# Patient Record
Sex: MALE | Race: BLACK OR AFRICAN AMERICAN | Employment: FULL TIME | URBAN - METROPOLITAN AREA
[De-identification: names, ages, dates, MRNs, and addresses within clinical notes are randomized per-mention and may not be internally consistent; named-entity substitution may affect disease eponyms.]

---

## 2017-04-11 ENCOUNTER — DOCUMENTATION ONLY (OUTPATIENT)
Dept: FAMILY MEDICINE CLINIC | Age: 22
End: 2017-04-11

## 2017-12-26 ENCOUNTER — HOSPITAL ENCOUNTER (OUTPATIENT)
Dept: GENERAL RADIOLOGY | Age: 22
Discharge: HOME | End: 2017-12-26
Payer: OTHER GOVERNMENT

## 2017-12-26 ENCOUNTER — OFFICE VISIT (OUTPATIENT)
Dept: FAMILY MEDICINE CLINIC | Age: 22
End: 2017-12-26

## 2017-12-26 VITALS
OXYGEN SATURATION: 98 % | DIASTOLIC BLOOD PRESSURE: 81 MMHG | HEIGHT: 74 IN | TEMPERATURE: 98 F | SYSTOLIC BLOOD PRESSURE: 123 MMHG | RESPIRATION RATE: 18 BRPM | BODY MASS INDEX: 30.03 KG/M2 | WEIGHT: 234 LBS | HEART RATE: 60 BPM

## 2017-12-26 DIAGNOSIS — M79.675 GREAT TOE PAIN, LEFT: ICD-10-CM

## 2017-12-26 DIAGNOSIS — B34.9 VIRAL ILLNESS: ICD-10-CM

## 2017-12-26 DIAGNOSIS — M79.675 GREAT TOE PAIN, LEFT: Primary | ICD-10-CM

## 2017-12-26 PROCEDURE — 73660 X-RAY EXAM OF TOE(S): CPT

## 2017-12-26 PROCEDURE — 73630 X-RAY EXAM OF FOOT: CPT

## 2017-12-26 RX ORDER — IBUPROFEN 800 MG/1
800 TABLET ORAL
Qty: 60 TAB | Refills: 1 | Status: SHIPPED | OUTPATIENT
Start: 2017-12-26

## 2017-12-26 NOTE — PROGRESS NOTES
Chief Complaint   Patient presents with    Mouth Lesions    Nasal Congestion    Cough    Toe Pain     left great toe     Patient in office today for mouth lesions and cold sx that began 2 wks ago; have not treated with otc. Denies any changes in medication, spices, or foods. Sores in the gumlines and cut like feeling in the mouth. States looked like a little cut. Comes and goes. Varying places in the mouth. States that everything cleared up on its own. Last occurred about a week ago. Under a lot of stress right now. Working two jobs and not sleeping as much. Had associated sinus congestion and cough. Now more congestion in the throat that he is able to cough up. Denies any fever. Denies any sick contacts. Took benadryl OTC without relief. Pt have c/o toe pain that began 4 wks ago, pt states he stubbed toe on wall. Have not noticed improvement in pain. Ran the toe into the wall. Tried to walk it off. Denies any swelling or bruising. Now had persistent pain if he hits his toe on something. Denies any other concerns at this time. Chief Complaint   Patient presents with    Mouth Lesions    Nasal Congestion    Cough    Toe Pain     left great toe     he is a 25y.o. year old male who presents for evalution. Reviewed PmHx, RxHx, FmHx, SocHx, AllgHx and updated and dated in the chart.     Review of Systems - negative except as listed above in the HPI    Objective:     Vitals:    12/26/17 0848   BP: 123/81   Pulse: 60   Resp: 18   Temp: 98 °F (36.7 °C)   TempSrc: Oral   SpO2: 98%   Weight: 234 lb (106.1 kg)   Height: 6' 2\" (1.88 m)     Physical Examination: General appearance - alert, well appearing, and in no distress  Eyes - pupils equal and reactive, extraocular eye movements intact  Ears - bilateral TM's and external ear canals normal  Nose - normal and patent, no erythema, discharge or polyps and normal nontender sinuses  Mouth - mucous membranes moist, pharynx normal without lesions  Neck - supple, no significant adenopathy  Chest - clear to auscultation, no wheezes, rales or rhonchi, symmetric air entry  Heart - normal rate, regular rhythm, normal S1, S2, no murmurs  Musculoskeletal - abnormal exam of right foot, pain and swelling noted at distal first metatarsal / head of bone; toe ROM intact  Extremities - peripheral pulses normal, no edema, no clubbing or cyanosis    Assessment/ Plan:   Diagnoses and all orders for this visit:    1. Great toe pain, left  -     XR GREAT TOE LT MIN 2 V; Future  -     XR FOOT LT MIN 3 V; Future  -     ibuprofen (MOTRIN) 800 mg tablet; Take 1 Tab by mouth every eight (8) hours as needed for Pain. Recommended xray for further evaluation. Will notify results and deviate plan based on findings. PRN motrin for pain. Reviewed other supportive measures. If sx persist or worsen, will need to see ortho for further evaluation. 2. Viral illness  No evidence of persistent sx. Continue supportive measures. Follow up if sx persist or worsen. Follow-up Disposition:  Return if symptoms worsen or fail to improve. I have discussed the diagnosis with the patient and the intended plan as seen in the above orders. The patient has received an after-visit summary and questions were answered concerning future plans. Medication Side Effects and Warnings were discussed with patient: yes  Patient Labs were reviewed and or requested: yes  Patient Past Records were reviewed and or requested  yes  Patient / Caregiver Understanding of treatment plan was verbalized during office visit YES    DREA Sutton    There are no Patient Instructions on file for this visit.

## 2017-12-26 NOTE — MR AVS SNAPSHOT
Visit Information Date & Time Provider Department Dept. Phone Encounter #  
 12/26/2017  8:30 AM Bora Rdz NP [address] [facility] [phone] [id number] Follow-up Instructions Return if symptoms worsen or fail to improve. Upcoming Health Maintenance Date Due DTaP/Tdap/Td series (1 - Tdap) 2/28/2016 Influenza Age 5 to Adult 8/1/2017 Allergies as of 12/26/2017  Review Complete On: 12/26/2017 By: Bora Rdz NP No Known Allergies Current Immunizations  Never Reviewed No immunizations on file. Not reviewed this visit You Were Diagnosed With   
  
 Codes Comments Great toe pain, left    -  Primary ICD-10-CM: B64.874 ICD-9-CM: 729.5 Viral illness     ICD-10-CM: B34.9 ICD-9-CM: 079.99 Vitals BP Pulse Temp Resp Height(growth percentile) Weight(growth percentile) 123/81 (BP 1 Location: Right arm, BP Patient Position: Sitting) 60 98 °F (36.7 °C) (Oral) 18 6' 2\" (1.88 m) 234 lb (106.1 kg) SpO2 BMI Smoking Status 98% 30.04 kg/m2 Never Smoker Vitals History BMI and BSA Data Body Mass Index Body Surface Area 30.04 kg/m 2 2.35 m 2 Preferred Pharmacy Pharmacy Name Phone [address] [phone] Your Updated Medication List  
  
   
This list is accurate as of: 12/26/17  9:18 AM.  Always use your most recent med list.  
  
  
  
  
 ibuprofen 800 mg tablet Commonly known as:  MOTRIN Take 1 Tab by mouth every eight (8) hours as needed for Pain. Prescriptions Sent to Pharmacy Refills  
 ibuprofen (MOTRIN) 800 mg tablet 1 Sig: Take 1 Tab by mouth every eight (8) hours as needed for Pain. Class: Normal  
 Pharmacy: [facility] [address] [facility] [address] Ph #: [phone] Route: Oral  
  
Follow-up Instructions Return if symptoms worsen or fail to improve. To-Do List   
 12/26/2017 Imaging:  XR FOOT LT MIN 3 V   
  
 12/26/2017 Imaging:  XR GREAT TOE LT MIN 2 V Introducing [facility] HEALTH SERVICES! [facility] introduces Mobile[employer]aks patient portal. Now you can access parts of your medical record, email your doctor's office, and request medication refills online. 1. In your internet browser, go to [email or web address] 2. Click on the First Time User? Click Here link in the Sign In box. You will see the New Member Sign Up page. 3. Enter your MobilePaks Access Code exactly as it appears below. You will not need to use this code after youve completed the sign-up process. If you do not sign up before the expiration date, you must request a new code. · MobilePaks Access Code: [email or web address] Expires: 3/26/2018  9:18 AM 
 
4. Enter the last four digits of your Social Security Number (xxxx) and Date of Birth (mm/dd/yyyy) as indicated and click Submit. You will be taken to the next sign-up page. 5. Create a MobilePaks ID. This will be your MobilePaks login ID and cannot be changed, so think of one that is secure and easy to remember. 6. Create a MobilePaks password. You can change your password at any time. 7. Enter your Password Reset Question and Answer. This can be used at a later time if you forget your password. 8. Enter your e-mail address. You will receive e-mail notification when new information is available in [address] 9. Click Sign Up. You can now view and download portions of your medical record. 10. Click the Download Summary menu link to download a portable copy of your medical information. If you have questions, please visit the Frequently Asked Questions section of the MobilePaks website. Remember, MobilePaks is NOT to be used for urgent needs. For medical emergencies, dial 911. Now available from your iPhone and Android! Please provide this summary of care documentation to your next provider. Your primary care clinician is listed as JOSEFINA WILLETT. If you have any questions after today's visit, please call [phone].

## 2017-12-26 NOTE — PROGRESS NOTES
Chief Complaint   Patient presents with    Mouth Lesions    Nasal Congestion    Cough    Toe Pain     left great toe     Patient in office today for mouth lesions and cold sx that began 2 wks ago; have not treated with otc. Denies any changes in medication, spices, or foods. Pt have c/of of toe pain that began 4 wk ago,pt states he stubbed toe on wall. Have not noticed improvement in pain. 1. Have you been to the ER, urgent care clinic since your last visit? Hospitalized since your last visit? No    2. Have you seen or consulted any other health care providers outside of the [address] [place] [address] since your last visit? Include any pap smears or colon screening.  No

## 2017-12-26 NOTE — PROGRESS NOTES
Please notify pt the following:  Xray of foot and toe show that there is nonspecific swelling present where pt reports feeling pain (first metatarsal head). There is no evidence of any fracture present. At this point I recommend he continue to rest, apply ice intermittently, elevate, and take 800 mg motrin prescribed during OV TID PRN for pain. If sx persist or worsen after 2 weeks of supportive measures, I recommend he see ortho for further evaluation.

## 2018-02-15 ENCOUNTER — OFFICE VISIT (OUTPATIENT)
Dept: FAMILY MEDICINE CLINIC | Age: 23
End: 2018-02-15

## 2018-02-15 VITALS
WEIGHT: 236 LBS | SYSTOLIC BLOOD PRESSURE: 117 MMHG | RESPIRATION RATE: 18 BRPM | BODY MASS INDEX: 30.29 KG/M2 | TEMPERATURE: 98.4 F | OXYGEN SATURATION: 97 % | HEART RATE: 57 BPM | DIASTOLIC BLOOD PRESSURE: 73 MMHG | HEIGHT: 74 IN

## 2018-02-15 DIAGNOSIS — M79.675 PAIN OF TOE OF LEFT FOOT: ICD-10-CM

## 2018-02-15 DIAGNOSIS — L50.9 URTICARIA: Primary | ICD-10-CM

## 2018-02-15 RX ORDER — LORATADINE 10 MG/1
10 TABLET ORAL DAILY
Qty: 30 TAB | Refills: 11 | Status: SHIPPED | OUTPATIENT
Start: 2018-02-15 | End: 2018-05-17 | Stop reason: ALTCHOICE

## 2018-02-15 NOTE — MR AVS SNAPSHOT
[address] [address] 
[phone] Patient: Samy Cooper MRN:  :[id number] Visit Information Date & Time Provider Department Dept. Phone Encounter #  
 2/15/2018  9:45 AM Charles Dockery  [facility] [phone] [id number] Follow-up Instructions Return if symptoms worsen or fail to improve. Upcoming Health Maintenance Date Due DTaP/Tdap/Td series (1 - Tdap) 2016 Influenza Age 5 to Adult 2017 Allergies as of 2/15/2018  Review Complete On: 2/15/2018 By: Charles Dockery MD  
 No Known Allergies Current Immunizations  Never Reviewed No immunizations on file. Not reviewed this visit You Were Diagnosed With   
  
 Codes Comments Urticaria    -  Primary ICD-10-CM: L50.9 ICD-9-CM: 708.9 Pain of toe of left foot     ICD-10-CM: A24.894 ICD-9-CM: 729.5 Vitals BP Pulse Temp Resp Height(growth percentile) Weight(growth percentile) 117/73 (!) 57 98.4 °F (36.9 °C) (Oral) 18 6' 2\" (1.88 m) 236 lb (107 kg) SpO2 BMI Smoking Status 97% 30.3 kg/m2 Never Smoker Vitals History BMI and BSA Data Body Mass Index Body Surface Area  
 30.3 kg/m 2 2.36 m 2 Preferred Pharmacy Pharmacy Name Phone [address] [phone] Your Updated Medication List  
  
   
This list is accurate as of: 2/15/18 10:32 AM.  Always use your most recent med list.  
  
  
  
  
 ibuprofen 800 mg tablet Commonly known as:  MOTRIN Take 1 Tab by mouth every eight (8) hours as needed for Pain.  
  
 loratadine 10 mg tablet Commonly known as:  Liu Alley Take 1 Tab by mouth daily for 360 days. Prescriptions Sent to Pharmacy  Refills  
 loratadine (CLARITIN) 10 mg tablet 11  
 Sig: Take 1 Tab by mouth daily for 360 days. Class: Normal  
 Pharmacy: [facility] [place] [address] [facility] [address]  #: [phone] Route: Oral  
  
Follow-up Instructions Return if symptoms worsen or fail to improve. Introducing [facility] & HEALTH SERVICES! [facility] introduces [employer] patient portal. Now you can access parts of your medical record, email your doctor's office, and request medication refills online. 1. In your internet browser, go to [email or web address]. [email or web address] 2. Click on the First Time User? Click Here link in the Sign In box. You will see the New Member Sign Up page. 3. Enter your Obatech Access Code exactly as it appears below. You will not need to use this code after youve completed the sign-up process. If you do not sign up before the expiration date, you must request a new code. · Ob[employer]ch Access Code: [email or web address] Expires: 3/26/2018  9:18 AM 
 
4. Enter the last four digits of your Social Security Number (xxxx) and Date of Birth (mm/dd/yyyy) as indicated and click Submit. You will be taken to the next sign-up page. 5. Create a Obatech ID. This will be your Obatech login ID and cannot be changed, so think of one that is secure and easy to remember. 6. Create a Obatech password. You can change your password at any time. 7. Enter your Password Reset Question and Answer. This can be used at a later time if you forget your password. 8. Enter your e-mail address. You will receive e-mail notification when new information is available in [address] 9. Click Sign Up. You can now view and download portions of your medical record. 10. Click the Download Summary menu link to download a portable copy of your medical information. If you have questions, please visit the Frequently Asked Questions section of the Obatech website.  Remember, Obatech is NOT to be used for urgent needs. For medical emergencies, dial 911. Now available from your iPhone and Android! Please provide this summary of care documentation to your next provider. Your primary care clinician is listed as JOSEFINA WILLETT. If you have any questions after today's visit, please call [phone].

## 2018-02-15 NOTE — PROGRESS NOTES
Chief Complaint   Patient presents with    Rash     Rises with body heat    Foot Pain     Lt foot Great toe: stubbed/Ran into wall      Shaving bumps exception note    1. Have you been to the ER, urgent care clinic since your last visit? Hospitalized since your last visit? No    2. Have you seen or consulted any other health care providers outside of the [address] [place] [address] since your last visit? Include any pap smears or colon screening. No      One month ago injured toe      Chief Complaint   Patient presents with    Rash     Rises with body heat    Foot Pain     Lt foot Great toe: stubbed/Ran into wall      He is a 25 y.o. male who presents for evalution. Reviewed PmHx, RxHx, FmHx, SocHx, AllgHx and updated and dated in the chart. There are no active problems to display for this patient. Review of Systems - negative except as listed above in the HPI    Objective:     Vitals:    02/15/18 0956   BP: 117/73   Pulse: (!) 57   Resp: 18   Temp: 98.4 °F (36.9 °C)   TempSrc: Oral   SpO2: 97%   Weight: 236 lb (107 kg)   Height: 6' 2\" (1.88 m)     Physical Examination: General appearance - alert, well appearing, and in no distress  Chest - clear to auscultation, no wheezes, rales or rhonchi, symmetric air entry  Heart - normal rate, regular rhythm, normal S1, S2, no murmurs, rubs, clicks or gallops  Left great toe normal exam    Assessment/ Plan:   Diagnoses and all orders for this visit:    1. Urticaria  -     loratadine (CLARITIN) 10 mg tablet; Take 1 Tab by mouth daily for 360 days.  -add daily to see if better    2. Pain of toe of left foot  -observe, slowly getting better       Follow-up Disposition:  Return if symptoms worsen or fail to improve. I have discussed the diagnosis with the patient and the intended plan as seen in the above orders. The patient understands and agrees with the plan. The patient has received an after-visit summary and questions were answered concerning future plans. Medication Side Effects and Warnings were discussed with patient  Patient Labs were reviewed and or requested:  Patient Past Records were reviewed and or requested    Linnea Ribeiro M.D. There are no Patient Instructions on file for this visit.           \

## 2018-03-12 ENCOUNTER — DOCUMENTATION ONLY (OUTPATIENT)
Dept: FAMILY MEDICINE CLINIC | Age: 23
End: 2018-03-12

## 2018-03-12 NOTE — PROGRESS NOTES
Letter written & pt called to inform letter in front office for pick-up- voiced understanding. English

## 2018-03-14 ENCOUNTER — OFFICE VISIT (OUTPATIENT)
Dept: FAMILY MEDICINE CLINIC | Age: 23
End: 2018-03-14

## 2018-03-14 VITALS
OXYGEN SATURATION: 97 % | DIASTOLIC BLOOD PRESSURE: 69 MMHG | HEART RATE: 70 BPM | WEIGHT: 240 LBS | BODY MASS INDEX: 30.8 KG/M2 | HEIGHT: 74 IN | TEMPERATURE: 98.3 F | SYSTOLIC BLOOD PRESSURE: 121 MMHG | RESPIRATION RATE: 16 BRPM

## 2018-03-14 DIAGNOSIS — L50.9 URTICARIA: Primary | ICD-10-CM

## 2018-03-14 NOTE — LETTER
3/14/2018 11:29 AM 
 
Mr. Pete Voss Jose Patient suffers from urticaria due to contact and excessive heat production. Sincerely, Derek Lopez MD

## 2018-03-14 NOTE — MR AVS SNAPSHOT
[address] [address] 
[phone] Patient: Digna Toribio MRN:  :[id number] Visit Information Date & Time Provider Department Dept. Phone Encounter #  
 3/14/2018 10:00 AM Delfino Lees MD [address] [facility] [phone] [id number] Follow-up Instructions Return if symptoms worsen or fail to improve. Upcoming Health Maintenance Date Due DTaP/Tdap/Td series (1 - Tdap) 2016 Influenza Age 5 to Adult 2017 Allergies as of 3/14/2018  Review Complete On: 3/14/2018 By: Delfino Lees MD  
 No Known Allergies Current Immunizations  Never Reviewed No immunizations on file. Not reviewed this visit You Were Diagnosed With   
  
 Codes Comments Urticaria    -  Primary ICD-10-CM: L50.9 ICD-9-CM: 708. 9 Vitals BP Pulse Temp Resp Height(growth percentile) Weight(growth percentile) 121/69 70 98.3 °F (36.8 °C) (Oral) 16 6' 2\" (1.88 m) 240 lb (108.9 kg) SpO2 BMI Smoking Status 97% 30.81 kg/m2 Never Smoker Vitals History BMI and BSA Data Body Mass Index Body Surface Area  
 30.81 kg/m 2 2.38 m 2 Preferred Pharmacy Pharmacy Name Phone [facility] [phone] Your Updated Medication List  
  
   
This list is accurate as of 3/14/18 11:29 AM.  Always use your most recent med list.  
  
  
  
  
 ibuprofen 800 mg tablet Commonly known as:  MOTRIN Take 1 Tab by mouth every eight (8) hours as needed for Pain.  
  
 loratadine 10 mg tablet Commonly known as:  Tk Getting Take 1 Tab by mouth daily for 360 days. Follow-up Instructions Return if symptoms worsen or fail to improve. Introducing [facility] & HEALTH SERVICES!    
 [facility] introduces [employer] patient portal. Now you can access parts of your medical record, email your doctor's office, and request medication refills online. 1. In your internet browser, go to [email or web address] 2. Click on the First Time User? Click Here link in the Sign In box. You will see the New Member Sign Up page. 3. Enter your iLost Access Code exactly as it appears below. You will not need to use this code after youve completed the sign-up process. If you do not sign up before the expiration date, you must request a new code. · iLost Access Code: [email or web address] Expires: 3/26/2018 10:18 AM 
 
4. Enter the last four digits of your Social Security Number (xxxx) and Date of Birth (mm/dd/yyyy) as indicated and click Submit. You will be taken to the next sign-up page. 5. Create a iLost ID. This will be your iLost login ID and cannot be changed, so think of one that is secure and easy to remember. 6. Create a iLost password. You can change your password at any time. 7. Enter your Password Reset Question and Answer. This can be used at a later time if you forget your password. 8. Enter your e-mail address. You will receive e-mail notification when new information is available in [address] 9. Click Sign Up. You can now view and download portions of your medical record. 10. Click the Download Summary menu link to download a portable copy of your medical information. If you have questions, please visit the Frequently Asked Questions section of the iLost website. Remember, iLost is NOT to be used for urgent needs. For medical emergencies, dial 911. Now available from your iPhone and Android! Please provide this summary of care documentation to your next provider. Your primary care clinician is listed as JOSEFINA WILLETT. If you have any questions after today's visit, please call [phone].

## 2018-03-14 NOTE — PROGRESS NOTES
Chief Complaint   Patient presents with    Allergies     Requests referral    Skin Problem     1. Have you been to the ER, urgent care clinic since your last visit? Hospitalized since your last visit? No    2. Have you seen or consulted any other health care providers outside of the [address] [place] [address] since your last visit? Include any pap smears or colon screening. Yes Where: [facility]        Chief Complaint   Patient presents with    Allergies     Requests referral    Skin Problem     He is a 21 y.o. male who presents for evalution. Reviewed PmHx, RxHx, FmHx, SocHx, AllgHx and updated and dated in the chart. Patient Active Problem List    Diagnosis    Urticaria       Review of Systems - negative except as listed above in the HPI    Objective:     Vitals:    03/14/18 1101   BP: 121/69   Pulse: 70   Resp: 16   Temp: 98.3 °F (36.8 °C)   TempSrc: Oral   SpO2: 97%   Weight: 240 lb (108.9 kg)   Height: 6' 2\" (1.88 m)     Physical Examination: General appearance - alert, well appearing, and in no distress  Chest - clear to auscultation, no wheezes, rales or rhonchi, symmetric air entry  Heart - normal rate, regular rhythm, normal S1, S2, no murmurs, rubs, clicks or gallops    Assessment/ Plan:   Diagnoses and all orders for this visit:    1. Urticaria  -better with rx       Follow-up Disposition:  Return if symptoms worsen or fail to improve. I have discussed the diagnosis with the patient and the intended plan as seen in the above orders. The patient understands and agrees with the plan. The patient has received an after-visit summary and questions were answered concerning future plans. Medication Side Effects and Warnings were discussed with patient  Patient Labs were reviewed and or requested:  Patient Past Records were reviewed and or requested    Teresa Owens M.D. There are no Patient Instructions on file for this visit.

## 2018-05-17 ENCOUNTER — OFFICE VISIT (OUTPATIENT)
Dept: FAMILY MEDICINE CLINIC | Age: 23
End: 2018-05-17

## 2018-05-17 VITALS
WEIGHT: 244 LBS | TEMPERATURE: 98 F | DIASTOLIC BLOOD PRESSURE: 68 MMHG | SYSTOLIC BLOOD PRESSURE: 103 MMHG | BODY MASS INDEX: 31.32 KG/M2 | RESPIRATION RATE: 20 BRPM | HEIGHT: 74 IN | HEART RATE: 59 BPM | OXYGEN SATURATION: 96 %

## 2018-05-17 DIAGNOSIS — M25.561 ACUTE PAIN OF BOTH KNEES: ICD-10-CM

## 2018-05-17 DIAGNOSIS — M25.562 ACUTE PAIN OF BOTH KNEES: ICD-10-CM

## 2018-05-17 DIAGNOSIS — M54.50 ACUTE BILATERAL LOW BACK PAIN WITHOUT SCIATICA: Primary | ICD-10-CM

## 2018-05-17 RX ORDER — DICLOFENAC SODIUM 75 MG/1
75 TABLET, DELAYED RELEASE ORAL 2 TIMES DAILY
Qty: 60 TAB | Refills: 2 | Status: SHIPPED | OUTPATIENT
Start: 2018-05-17 | End: 2018-05-31

## 2018-05-17 NOTE — PROGRESS NOTES
Patient here for right knee pain. He states he has back pain from an old injury and he feels like this is causing knee pain, however he is also having joint pain in hands as well. 1. Have you been to the ER, urgent care clinic since your last visit? Hospitalized since your last visit? No    2. Have you seen or consulted any other health care providers outside of the [address] [place] [address] since your last visit? Include any pap smears or colon screening. No       Chief Complaint   Patient presents with    Knee Pain     dejuan  knee pain, back pain    Joint Pain     He is a 21 y.o. male who presents for evalution. Reviewed PmHx, RxHx, FmHx, SocHx, AllgHx and updated and dated in the chart. Patient Active Problem List    Diagnosis    Urticaria       Review of Systems - negative except as listed above in the HPI    Objective:     Vitals:    05/17/18 1114   BP: 103/68   Pulse: (!) 59   Resp: 20   Temp: 98 °F (36.7 °C)   SpO2: 96%   Weight: 244 lb (110.7 kg)   Height: 6' 2\" (1.88 m)     Physical Examination: General appearance - alert, well appearing, and in no distress  Chest - clear to auscultation, no wheezes, rales or rhonchi, symmetric air entry  Heart - normal rate, regular rhythm, normal S1, S2, no murmurs, rubs, clicks or gallops  Musculoskeletal - no joint tenderness, deformity or swelling    Assessment/ Plan:   Diagnoses and all orders for this visit:    1. Acute bilateral low back pain without sciatica  -     diclofenac EC (VOLTAREN) 75 mg EC tablet; Take 1 Tab by mouth two (2) times a day for 14 days. 2. Acute pain of both knees  -     diclofenac EC (VOLTAREN) 75 mg EC tablet; Take 1 Tab by mouth two (2) times a day for 14 days. Follow-up Disposition:  Return if symptoms worsen or fail to improve. I have discussed the diagnosis with the patient and the intended plan as seen in the above orders. The patient understands and agrees with the plan.  The patient has received an after-visit summary and questions were answered concerning future plans. Medication Side Effects and Warnings were discussed with patient  Patient Labs were reviewed and or requested:  Patient Past Records were reviewed and or requested    Brittany Wallace M.D. There are no Patient Instructions on file for this visit.

## 2018-05-17 NOTE — MR AVS SNAPSHOT
[address] [address] 
[phone] Patient: Itzel Matamoros MRN:  :[id number] Visit Information Date & Time Provider Department Dept. Phone Encounter #  
 2018 10:45 AM Suze Ventura MD [address] [facility] [phone] [id number] Follow-up Instructions Return if symptoms worsen or fail to improve. Upcoming Health Maintenance Date Due DTaP/Tdap/Td series (1 - Tdap) 2016 Influenza Age 5 to Adult 2018 Allergies as of 2018  Review Complete On: 2018 By: Suze Ventura MD  
 No Known Allergies Current Immunizations  Never Reviewed No immunizations on file. Not reviewed this visit You Were Diagnosed With   
  
 Codes Comments Acute bilateral low back pain without sciatica    -  Primary ICD-10-CM: M54.5 ICD-9-CM: 724.2, 338.19 Acute pain of both knees     ICD-10-CM: M25.561, M25.562 ICD-9-CM: 338.19, 719.46 Vitals BP Pulse Temp Resp Height(growth percentile) Weight(growth percentile) 103/68 (!) 59 98 °F (36.7 °C) 20 6' 2\" (1.88 m) 244 lb (110.7 kg) SpO2 BMI Smoking Status 96% 31.33 kg/m2 Never Smoker Vitals History BMI and BSA Data Body Mass Index Body Surface Area  
 31.33 kg/m 2 2.4 m 2 Preferred Pharmacy Pharmacy Name Phone [address] [phone] Your Updated Medication List  
  
   
This list is accurate as of 18 11:30 AM.  Always use your most recent med list.  
  
  
  
  
 diclofenac EC 75 mg EC tablet Commonly known as:  VOLTAREN Take 1 Tab by mouth two (2) times a day for 14 days. ibuprofen 800 mg tablet Commonly known as:  MOTRIN Take 1 Tab by mouth every eight (8) hours as needed for Pain. ZyrTEC 10 mg Cap Generic drug:  Cetirizine Take  by mouth. Prescriptions Sent to Pharmacy Refills  
 diclofenac EC (VOLTAREN) 75 mg EC tablet 2 Sig: Take 1 Tab by mouth two (2) times a day for 14 days. Class: Normal  
 [facility] [address] [facility] [address]  #: [phone] Route: Oral  
  
Follow-up Instructions Return if symptoms worsen or fail to improve. Introducing [facility] & HEALTH SERVICES! [employer] introduces Freshdesk patient portal. Now you can access parts of your medical record, email your doctor's office, and request medication refills online. 1. In your internet browser, go to [email or web address] 2. Click on the First Time User? Click Here link in the Sign In box. You will see the New Member Sign Up page. 3. Enter your Freshdesk Access Code exactly as it appears below. You will not need to use this code after youve completed the sign-up process. If you do not sign up before the expiration date, you must request a new code. · Freshdesk Access Code: [email or web address] Expires: 8/15/2018 11:30 AM 
 
4. Enter the last four digits of your Social Security Number (xxxx) and Date of Birth (mm/dd/yyyy) as indicated and click Submit. You will be taken to the next sign-up page. 5. Create a Freshdesk ID. This will be your Freshdesk login ID and cannot be changed, so think of one that is secure and easy to remember. 6. Create a Freshdesk password. You can change your password at any time. 7. Enter your Password Reset Question and Answer. This can be used at a later time if you forget your password. 8. Enter your e-mail address. You will receive e-mail notification when new information is available in [address] 9. Click Sign Up. You can now view and download portions of your medical record. 10. Click the Download Summary menu link to download a portable copy of your medical information. If you have questions, please visit the Frequently Asked Questions section of the [employer] website. Remember, OWM is NOT to be used for urgent needs. For medical emergencies, dial 911. Now available from your iPhone and Android! Please provide this summary of care documentation to your next provider. Your primary care clinician is listed as JOSEFINA WILLETT. If you have any questions after today's visit, please call [phone].

## 2018-05-23 ENCOUNTER — OFFICE VISIT (OUTPATIENT)
Dept: FAMILY MEDICINE CLINIC | Age: 23
End: 2018-05-23

## 2018-05-23 VITALS
SYSTOLIC BLOOD PRESSURE: 113 MMHG | DIASTOLIC BLOOD PRESSURE: 69 MMHG | HEIGHT: 74 IN | RESPIRATION RATE: 16 BRPM | OXYGEN SATURATION: 97 % | TEMPERATURE: 98.4 F | WEIGHT: 242 LBS | BODY MASS INDEX: 31.06 KG/M2 | HEART RATE: 62 BPM

## 2018-05-23 DIAGNOSIS — G89.29 CHRONIC BILATERAL LOW BACK PAIN WITHOUT SCIATICA: ICD-10-CM

## 2018-05-23 DIAGNOSIS — M54.50 CHRONIC BILATERAL LOW BACK PAIN WITHOUT SCIATICA: ICD-10-CM

## 2018-05-23 DIAGNOSIS — F33.9 EPISODE OF RECURRENT MAJOR DEPRESSIVE DISORDER, UNSPECIFIED DEPRESSION EPISODE SEVERITY (HCC): Primary | ICD-10-CM

## 2018-05-23 RX ORDER — FLUOXETINE HYDROCHLORIDE 20 MG/1
20 CAPSULE ORAL DAILY
Qty: 30 CAP | Refills: 1 | Status: SHIPPED | OUTPATIENT
Start: 2018-05-23 | End: 2018-06-20 | Stop reason: SDUPTHER

## 2018-05-23 NOTE — MR AVS SNAPSHOT
[address] [address] 
[phone] Patient: Nusrat Garcia MRN:  :[id number] Visit Information Date & Time Provider Department Dept. Phone Encounter #  
 2018 11:10 AM Tamanna Gomez MD [address] [facility] [phone] [id number] Follow-up Instructions Return in about 1 month (around 2018), or if symptoms worsen or fail to improve. Upcoming Health Maintenance Date Due DTaP/Tdap/Td series (1 - Tdap) 2016 Influenza Age 5 to Adult 2018 Allergies as of 2018  Review Complete On: 2018 By: Tamanna Gomez MD  
 No Known Allergies Current Immunizations  Never Reviewed No immunizations on file. Not reviewed this visit You Were Diagnosed With   
  
 Codes Comments Episode of recurrent major depressive disorder, unspecified depression episode severity ([facility].)    -  Primary ICD-10-CM: F33.9 ICD-9-CM: 296.30 Vitals BP Pulse Temp Resp Height(growth percentile) Weight(growth percentile) 113/69 62 98.4 °F (36.9 °C) (Oral) 16 6' 2\" (1.88 m) 242 lb (109.8 kg) SpO2 BMI Smoking Status 97% 31.07 kg/m2 Never Smoker Vitals History BMI and BSA Data Body Mass Index Body Surface Area 31.07 kg/m 2 2.39 m 2 Preferred Pharmacy Pharmacy Name Phone [address] [phone] Your Updated Medication List  
  
   
This list is accurate as of 18 11:37 AM.  Always use your most recent med list.  
  
  
  
  
 diclofenac EC 75 mg EC tablet Commonly known as:  VOLTAREN Take 1 Tab by mouth two (2) times a day for 14 days. FLUoxetine 20 mg capsule Commonly known as:  PROzac Take 1 Cap by mouth daily. Indications: major depressive disorder  
  
 ibuprofen 800 mg tablet Commonly known as:  MOTRIN Take 1 Tab by mouth every eight (8) hours as needed for Pain. ZyrTEC 10 mg Cap Generic drug:  Cetirizine Take  by mouth. Prescriptions Sent to Pharmacy Refills FLUoxetine (PROZAC) 20 mg capsule 1 Sig: Take 1 Cap by mouth daily. Indications: major depressive disorder Class: Normal  
 Pharmacy: [facility], [address] [facility] [place] [place] Ph #: [phone] Route: Oral  
  
We Performed the Following REFERRAL TO NEUROLOGY [MCW04 Custom] Follow-up Instructions Return in about 1 month (around 6/23/2018), or if symptoms worsen or fail to improve. Referral Information Referral ID Referred By Referred To  
  
 [id number] TAMIE, [address] [facility] [place] [address] [facility], [address] [facility] Phone: [phone] Fax: [phone] Visits Status Start Date End Date 1 New Request 5/23/18 5/23/19 If your referral has a status of pending review or denied, additional information will be sent to support the outcome of this decision. Introducing [facility] & HEALTH SERVICES! [facility] introduces XL Video patient portal. Now you can access parts of your medical record, email your doctor's office, and request medication refills online. 1. In your internet browser, go to [email or web address] 2. Click on the First Time User? Click Here link in the Sign In box. You will see the New Member Sign Up page. 3. Enter your XL Video Access Code exactly as it appears below. You will not need to use this code after youve completed the sign-up process. If you do not sign up before the expiration date, you must request a new code. · XL Video Access Code: [email or web address] Expires: 8/15/2018 11:30 AM 
 
4.  Enter the last four digits of your Social Security Number (xxxx) and Date of Birth (mm/dd/yyyy) as indicated and click Submit. You will be taken to the next sign-up page. 5. Create a Mir Tesen ID. This will be your Mir Tesen login ID and cannot be changed, so think of one that is secure and easy to remember. 6. Create a Mir Tesen password. You can change your password at any time. 7. Enter your Password Reset Question and Answer. This can be used at a later time if you forget your password. 8. Enter your e-mail address. You will receive e-mail notification when new information is available in [address] 9. Click Sign Up. You can now view and download portions of your medical record. 10. Click the Download Summary menu link to download a portable copy of your medical information. If you have questions, please visit the Frequently Asked Questions section of the Mir [employer]en website. Remember, Mir [employer]en is NOT to be used for urgent needs. For medical emergencies, dial 911. Now available from your iPhone and Android! Please provide this summary of care documentation to your next provider. Your primary care clinician is listed as JOSEFINA WILLETT. If you have any questions after today's visit, please call [phone].

## 2018-05-23 NOTE — PROGRESS NOTES
Chief Complaint   Patient presents with   [address] Problem     referral to psych    LOW BACK PAIN     1. Have you been to the ER, urgent care clinic since your last visit? Hospitalized since your last visit? No    2. Have you seen or consulted any other health care providers outside of the [facility] since your last visit? Include any pap smears or colon screening. Yes Where:  One Source:  Therapist

## 2018-05-23 NOTE — PROGRESS NOTES
Chief Complaint   Patient presents with   [address] Problem     referral to psych    LOW BACK PAIN     1. Have you been to the ER, urgent care clinic since your last visit? Hospitalized since your last visit? No    2. Have you seen or consulted any other health care providers outside of the [address] [place] [address] since your last visit? Include any pap smears or colon screening. Yes Where:  One Source: Therapist      Chief Complaint   Patient presents with   [address] Problem     referral to psych    LOW BACK PAIN     He is a 21 y.o. male who presents for evalution. Reviewed PmHx, RxHx, FmHx, SocHx, AllgHx and updated and dated in the chart. Patient Active Problem List    Diagnosis    Urticaria       Review of Systems - negative except as listed above in the HPI    Objective:     Vitals:    05/23/18 1110   BP: 113/69   Pulse: 62   Resp: 16   Temp: 98.4 °F (36.9 °C)   TempSrc: Oral   SpO2: 97%   Weight: 242 lb (109.8 kg)   Height: 6' 2\" (1.88 m)     Physical Examination: General appearance - alert, well appearing, and in no distress        Assessment/ Plan:   Diagnoses and all orders for this visit:    1. Episode of recurrent major depressive disorder, unspecified depression episode severity ([facility].)  -     [facility]. Neurology ref [facility]  -add Prozac 20--pt is reluctant  -is not suicidal     Follow-up Disposition:  Return in about 1 month (around 6/23/2018), or if symptoms worsen or fail to improve. I have discussed the diagnosis with the patient and the intended plan as seen in the above orders. The patient understands and agrees with the plan. The patient has received an after-visit summary and questions were answered concerning future plans. Medication Side Effects and Warnings were discussed with patient  Patient Labs were reviewed and or requested:  Patient Past Records were reviewed and or requested    Jerilyn Reed M.D.     There are no Patient Instructions on file for this visit.

## 2018-06-20 ENCOUNTER — OFFICE VISIT (OUTPATIENT)
Dept: FAMILY MEDICINE CLINIC | Age: 23
End: 2018-06-20

## 2018-06-20 VITALS
HEIGHT: 74 IN | WEIGHT: 241.7 LBS | TEMPERATURE: 98.5 F | DIASTOLIC BLOOD PRESSURE: 65 MMHG | OXYGEN SATURATION: 98 % | BODY MASS INDEX: 31.02 KG/M2 | HEART RATE: 66 BPM | SYSTOLIC BLOOD PRESSURE: 107 MMHG | RESPIRATION RATE: 16 BRPM

## 2018-06-20 DIAGNOSIS — F33.9 EPISODE OF RECURRENT MAJOR DEPRESSIVE DISORDER, UNSPECIFIED DEPRESSION EPISODE SEVERITY (HCC): ICD-10-CM

## 2018-06-20 RX ORDER — LORATADINE 10 MG/1
TABLET ORAL
Refills: 10 | COMMUNITY
Start: 2018-04-03 | End: 2018-11-01 | Stop reason: ALTCHOICE

## 2018-06-20 RX ORDER — FLUOXETINE HYDROCHLORIDE 20 MG/1
20 CAPSULE ORAL DAILY
Qty: 90 CAP | Refills: 1 | Status: SHIPPED | OUTPATIENT
Start: 2018-06-20 | End: 2019-05-08

## 2018-06-20 NOTE — MR AVS SNAPSHOT
[address] [address] 
[phone] Patient: Yolanda Sanches MRN:  :[id number] Visit Information Date & Time Provider Department Dept. Phone Encounter #  
 2018  9:30 AM Ted Sinclair MD [address] [facility] [phone] [id number] Follow-up Instructions Return if symptoms worsen or fail to improve. Upcoming Health Maintenance Date Due DTaP/Tdap/Td series (1 - Tdap) 2016 Influenza Age 5 to Adult 2018 Allergies as of 2018  Review Complete On: 2018 By: Ted Sinclair MD  
 No Known Allergies Current Immunizations  Never Reviewed No immunizations on file. Not reviewed this visit You Were Diagnosed With   
  
 Codes Comments Episode of recurrent major depressive disorder, unspecified depression episode severity ([facility]     ICD-10-CM: F33.9 ICD-9-CM: 296.30 Vitals BP Pulse Temp Resp Height(growth percentile) Weight(growth percentile) 107/65 66 98.5 °F (36.9 °C) (Oral) 16 6' 2\" (1.88 m) 241 lb 11.2 oz (109.6 kg) SpO2 BMI Smoking Status 98% 31.03 kg/m2 Never Smoker Vitals History BMI and BSA Data Body Mass Index Body Surface Area 31.03 kg/m 2 2.39 m 2 Preferred Pharmacy Pharmacy Name Phone [address]  [phone] Your Updated Medication List  
  
   
This list is accurate as of 18 10:19 AM.  Always use your most recent med list.  
  
  
  
  
 FLUoxetine 20 mg capsule Commonly known as:  PROzac Take 1 Cap by mouth daily. Indications: major depressive disorder  
  
 ibuprofen 800 mg tablet Commonly known as:  MOTRIN Take 1 Tab by mouth every eight (8) hours as needed for Pain.  
  
 loratadine 10 mg tablet Commonly known as:  CLARITIN  
TK 1 T PO  QD  
  
 ZyrTEC 10 mg Cap Generic drug:  Cetirizine Take  by mouth. Prescriptions Sent to Pharmacy Refills FLUoxetine (PROZAC) 20 mg capsule 1 Sig: Take 1 Cap by mouth daily. Indications: major depressive disorder Class: Normal  
 [facility] [address] [facility] [place] [place]  #: [phone] Route: Oral  
  
Follow-up Instructions Return if symptoms worsen or fail to improve. Introducing [facility] & HEALTH SERVICES! [facility] introduces "[employer] patient portal. Now you can access parts of your medical record, email your doctor's office, and request medication refills online. 1. In your internet browser, go to [email or web address] 2. Click on the First Time User? Click Here link in the Sign In box. You will see the New Member Sign Up page. 3. Enter your "[employer] Access Code exactly as it appears below. You will not need to use this code after youve completed the sign-up process. If you do not sign up before the expiration date, you must request a new code. · "[employer] Access Code: [email or web address] Expires: 8/15/2018 11:30 AM 
 
4. Enter the last four digits of your Social Security Number (xxxx) and Date of Birth (mm/dd/yyyy) as indicated and click Submit. You will be taken to the next sign-up page. 5. Create a "[employer] ID. This will be your "[employer] login ID and cannot be changed, so think of one that is secure and easy to remember. 6. Create a "[employer] password. You can change your password at any time. 7. Enter your Password Reset Question and Answer. This can be used at a later time if you forget your password. 8. Enter your e-mail address. You will receive e-mail notification when new information is available in [address] 9. Click Sign Up. You can now view and download portions of your medical record.  
10. Click the Download Summary menu link to download a portable copy of your medical information. If you have questions, please visit the Frequently Asked Questions section of the [employer] website. Remember, [employer] is NOT to be used for urgent needs. For medical emergencies, dial 911. Now available from your iPhone and Android! Please provide this summary of care documentation to your next provider. Your primary care clinician is listed as JOSEFINA WILLETT. If you have any questions after today's visit, please call [phone].

## 2018-06-20 NOTE — PROGRESS NOTES
Chief Complaint   Patient presents with    Depression     1. Have you been to the ER, urgent care clinic since your last visit? Hospitalized since your last visit? No    2. Have you seen or consulted any other health care providers outside of the [address] [place] [address] since your last visit? Include any pap smears or colon screening. Yes Where: Therapist      Feels better, seeing therapy      Chief Complaint   Patient presents with    Depression     He is a 21 y.o. male who presents for evalution. Reviewed PmHx, RxHx, FmHx, SocHx, AllgHx and updated and dated in the chart. Patient Active Problem List    Diagnosis    Episode of recurrent major depressive disorder ([facility]    Urticaria       Review of Systems - negative except as listed above in the HPI    Objective:     Vitals:    06/20/18 1006   BP: 107/65   Pulse: 66   Resp: 16   Temp: 98.5 °F (36.9 °C)   TempSrc: Oral   SpO2: 98%   Weight: 241 lb 11.2 oz (109.6 kg)   Height: 6' 2\" (1.88 m)     Physical Examination: General appearance - alert, well appearing, and in no distress  Chest - clear to auscultation, no wheezes, rales or rhonchi, symmetric air entry  Heart - normal rate, regular rhythm, normal S1, S2, no murmurs, rubs, clicks or gallops      Assessment/ Plan:   Diagnoses and all orders for this visit:    1. Episode of recurrent major depressive disorder, unspecified depression episode severity (HCC)  -     FLUoxetine (PROZAC) 20 mg capsule; Take 1 Cap by mouth daily. Indications: major depressive disorder  -better       Follow-up Disposition:  Return if symptoms worsen or fail to improve. I have discussed the diagnosis with the patient and the intended plan as seen in the above orders. The patient understands and agrees with the plan. The patient has received an after-visit summary and questions were answered concerning future plans.      Medication Side Effects and Warnings were discussed with patient  Patient Labs were reviewed and or requested:  Patient Past Records were reviewed and or requested    Sammie Alcantara M.D. There are no Patient Instructions on file for this visit.

## 2018-11-01 ENCOUNTER — OFFICE VISIT (OUTPATIENT)
Dept: FAMILY MEDICINE CLINIC | Age: 23
End: 2018-11-01

## 2018-11-01 VITALS
SYSTOLIC BLOOD PRESSURE: 109 MMHG | TEMPERATURE: 97 F | DIASTOLIC BLOOD PRESSURE: 72 MMHG | RESPIRATION RATE: 20 BRPM | OXYGEN SATURATION: 98 % | HEIGHT: 74 IN | BODY MASS INDEX: 32.47 KG/M2 | HEART RATE: 73 BPM | WEIGHT: 253 LBS

## 2018-11-01 DIAGNOSIS — G89.29 CHRONIC BILATERAL LOW BACK PAIN WITHOUT SCIATICA: Primary | ICD-10-CM

## 2018-11-01 DIAGNOSIS — M54.50 CHRONIC BILATERAL LOW BACK PAIN WITHOUT SCIATICA: Primary | ICD-10-CM

## 2018-11-01 RX ORDER — METHOCARBAMOL 750 MG/1
750 TABLET, FILM COATED ORAL 4 TIMES DAILY
Qty: 40 TAB | Refills: 0 | Status: SHIPPED | OUTPATIENT
Start: 2018-11-01 | End: 2018-11-11

## 2018-11-01 NOTE — PROGRESS NOTES
Patient here for back pain. He states he was in a hospital in [address] [place] [address] 3 weeks ago for same. He had muscle spasms. They put him on flexeril, but he has not used it due to how it made him tired all the time. He uses motrin to help alleviate the pain. 1. Have you been to the ER, urgent care clinic since your last visit? Hospitalized since your last visit? Yes When: 3 weeks ago    2. Have you seen or consulted any other health care providers outside of the [address] [place] [address] since your last visit? Include any pap smears or colon screening. No       Chief Complaint   Patient presents with    Back Pain     3 weeks ago in hospital in [address] [place] [address] for muscle spasms. getting worse     He is a 21 y.o. male who presents for evalution. Reviewed PmHx, RxHx, FmHx, SocHx, AllgHx and updated and dated in the chart. Patient Active Problem List    Diagnosis    Episode of recurrent major depressive disorder ([facility]    Urticaria       Review of Systems - negative except as listed above in the HPI    Objective:     Vitals:    11/01/18 0929   BP: 109/72   Pulse: 73   Resp: 20   Temp: 97 °F (36.1 °C)   SpO2: 98%   Weight: 253 lb (114.8 kg)   Height: 6' 2\" (1.88 m)     Physical Examination: General appearance - alert, well appearing, and in no distress  Back exam - full range of motion, no tenderness, palpable spasm or pain on motion      Assessment/ Plan:   Diagnoses and all orders for this visit:    1. Chronic bilateral low back pain without sciatica  -pt cannot afford PT  -dwp exercises   -work note  -cont with rx  -add Robaxin       Follow-up Disposition:  Return if symptoms worsen or fail to improve. I have discussed the diagnosis with the patient and the intended plan as seen in the above orders. The patient understands and agrees with the plan. The patient has received an after-visit summary and questions were answered concerning future plans.      Medication Side Effects and Warnings were discussed with patient  Patient Labs were reviewed and or requested:  Patient Past Records were reviewed and or requested    Aleah Quinn M.D. There are no Patient Instructions on file for this visit.

## 2019-04-09 ENCOUNTER — DOCUMENTATION ONLY (OUTPATIENT)
Dept: FAMILY MEDICINE CLINIC | Age: 24
End: 2019-04-09

## 2019-04-09 NOTE — PROGRESS NOTES
[facility] psychological & ADHD screening report was given to [facility]'s for patient's appt 4/10/19

## 2019-04-10 ENCOUNTER — OFFICE VISIT (OUTPATIENT)
Dept: FAMILY MEDICINE CLINIC | Age: 24
End: 2019-04-10

## 2019-04-10 VITALS
WEIGHT: 234 LBS | BODY MASS INDEX: 30.03 KG/M2 | HEIGHT: 74 IN | SYSTOLIC BLOOD PRESSURE: 120 MMHG | RESPIRATION RATE: 16 BRPM | TEMPERATURE: 98.3 F | OXYGEN SATURATION: 99 % | DIASTOLIC BLOOD PRESSURE: 74 MMHG | HEART RATE: 72 BPM

## 2019-04-10 DIAGNOSIS — F90.2 ATTENTION DEFICIT HYPERACTIVITY DISORDER (ADHD), COMBINED TYPE: Primary | ICD-10-CM

## 2019-04-10 NOTE — PROGRESS NOTES
Chief Complaint   Patient presents with    Behavioral Problem     Assesment review    Depression     NOT Taking Prozac    Inguinal Hernia     [facility] ED: 4/1/19       1. Have you been to the ER, urgent care clinic since your last visit? Hospitalized since your last visit? Yes Where: [facility] ED: 4/1/19    2. Have you seen or consulted any other health care providers outside of the [address] [place] [address] since your last visit? Include any pap smears or colon screening. Yes Where: Dr Aden Pinzon   Patient presents with    Behavioral Problem     Assesment review    Depression     NOT Taking Prozac    Inguinal Hernia     [facility] ED: 4/1/19     He is a 25 y.o. male who presents for evalution. Reviewed PmHx, RxHx, FmHx, SocHx, AllgHx and updated and dated in the chart. Patient Active Problem List    Diagnosis    Attention deficit hyperactivity disorder (ADHD), combined type    Episode of recurrent major depressive disorder ([facility]    Urticaria       Review of Systems - negative except as listed above in the HPI    Objective:     Vitals:    04/10/19 0800   BP: 120/74   Pulse: 72   Resp: 16   Temp: 98.3 °F (36.8 °C)   TempSrc: Oral   SpO2: 99%   Weight: 234 lb (106.1 kg)   Height: 6' 2\" (1.88 m)     Physical Examination: General appearance - alert, well appearing, and in no distress  Chest - clear to auscultation, no wheezes, rales or rhonchi, symmetric air entry  Heart - normal rate, regular rhythm, normal S1, S2, no murmurs, rubs, clicks or gallops    Assessment/ Plan:   Diagnoses and all orders for this visit:    1. Attention deficit hyperactivity disorder (ADHD), combined type  -     lisdexamfetamine (VYVANSE) 20 mg capsule; Take 1 Cap by mouth daily. Max Daily Amount: 20 mg.  -see scanned psych report  -add rx  -dwp adrs       Follow-up and Dispositions    · Return in about 1 month (around 5/10/2019).          I have discussed the diagnosis with the patient and the intended plan as seen in the above orders. The patient understands and agrees with the plan. The patient has received an after-visit summary and questions were answered concerning future plans. Medication Side Effects and Warnings were discussed with patient  Patient Labs were reviewed and or requested:  Patient Past Records were reviewed and or requested    Hakeem Gallegos M.D. There are no Patient Instructions on file for this visit.

## 2019-05-08 ENCOUNTER — OFFICE VISIT (OUTPATIENT)
Dept: FAMILY MEDICINE CLINIC | Age: 24
End: 2019-05-08

## 2019-05-08 VITALS
HEART RATE: 62 BPM | TEMPERATURE: 98.3 F | DIASTOLIC BLOOD PRESSURE: 81 MMHG | SYSTOLIC BLOOD PRESSURE: 128 MMHG | OXYGEN SATURATION: 99 % | WEIGHT: 228.8 LBS | HEIGHT: 74 IN | RESPIRATION RATE: 17 BRPM | BODY MASS INDEX: 29.36 KG/M2

## 2019-05-08 DIAGNOSIS — F90.2 ATTENTION DEFICIT HYPERACTIVITY DISORDER (ADHD), COMBINED TYPE: ICD-10-CM

## 2019-05-08 NOTE — PROGRESS NOTES
Chief Complaint   Patient presents with    Attention Deficit Disorder    Medication Refill     1. Have you been to the ER, urgent care clinic since your last visit? Hospitalized since your last visit? No    2. Have you seen or consulted any other health care providers outside of the [address] [place] [address] since your last visit? Include any pap smears or colon screening. No     Chief Complaint   Patient presents with    Attention Deficit Disorder    Medication Refill     He is a 25 y.o. male who presents for evalution. Reviewed PmHx, RxHx, FmHx, SocHx, AllgHx and updated and dated in the chart. Patient Active Problem List    Diagnosis    Attention deficit hyperactivity disorder (ADHD), combined type    Episode of recurrent major depressive disorder ([facility] [address]    Urticaria       Review of Systems - negative except as listed above in the HPI    Objective:     Vitals:    05/08/19 0735   BP: 128/81   Pulse: 62   Resp: 17   Temp: 98.3 °F (36.8 °C)   TempSrc: Oral   SpO2: 99%   Weight: 228 lb 12.8 oz (103.8 kg)   Height: 6' 2\" (1.88 m)     Physical Examination: General appearance - alert, well appearing, and in no distress  Neck - supple, no significant adenopathy  Chest - clear to auscultation, no wheezes, rales or rhonchi, symmetric air entry  Heart - normal rate, regular rhythm, normal S1, S2, no murmurs, rubs, clicks or gallops    Assessment/ Plan:   Diagnoses and all orders for this visit:    1. Attention deficit hyperactivity disorder (ADHD), combined type  -     Lisdexamfetamine (VYVANSE) 40 mg capsule; Take 1 Cap by mouth daily. Max Daily Amount: 40 mg.  -inc dose  -doing better  -may need PA due to inc dose    Other orders  -     Cetirizine (ZYRTEC) 10 mg cap; One tablet daily       Follow-up and Dispositions    · Return in about 3 months (around 8/8/2019) for add. I have discussed the diagnosis with the patient and the intended plan as seen in the above orders.   The patient understands and agrees with the plan. The patient has received an after-visit summary and questions were answered concerning future plans. Medication Side Effects and Warnings were discussed with patient  Patient Labs were reviewed and or requested:  Patient Past Records were reviewed and or requested    Paz Almeida M.D. There are no Patient Instructions on file for this visit.

## 2019-08-13 ENCOUNTER — OFFICE VISIT (OUTPATIENT)
Dept: FAMILY MEDICINE CLINIC | Age: 24
End: 2019-08-13

## 2019-08-13 VITALS
HEART RATE: 67 BPM | SYSTOLIC BLOOD PRESSURE: 126 MMHG | BODY MASS INDEX: 27.98 KG/M2 | DIASTOLIC BLOOD PRESSURE: 78 MMHG | TEMPERATURE: 98.8 F | RESPIRATION RATE: 18 BRPM | OXYGEN SATURATION: 98 % | HEIGHT: 74 IN | WEIGHT: 218 LBS

## 2019-08-13 DIAGNOSIS — F90.2 ATTENTION DEFICIT HYPERACTIVITY DISORDER (ADHD), COMBINED TYPE: ICD-10-CM

## 2019-08-13 NOTE — PROGRESS NOTES
Patient here for med refill. 1. Have you been to the ER, urgent care clinic since your last visit? Hospitalized since your last visit? No    2. Have you seen or consulted any other health care providers outside of the [address] [place] [address] since your last visit? Include any pap smears or colon screening. No         Chief Complaint   Patient presents with    Follow-up     3 month f/u     He is a 25 y.o. male who presents for evalution. Reviewed PmHx, RxHx, FmHx, SocHx, AllgHx and updated and dated in the chart. Patient Active Problem List    Diagnosis    Attention deficit hyperactivity disorder (ADHD), combined type    Episode of recurrent major depressive disorder ([facility] [address]    Urticaria       Review of Systems - negative except as listed above in the HPI    Objective:     Vitals:    08/13/19 1355   BP: 126/78   Pulse: 67   Resp: 18   Temp: 98.8 °F (37.1 °C)   SpO2: 98%   Weight: 218 lb (98.9 kg)   Height: 6' 2\" (1.88 m)     Physical Examination: General appearance - alert, well appearing, and in no distress  Chest - clear to auscultation, no wheezes, rales or rhonchi, symmetric air entry  Heart - normal rate, regular rhythm, normal S1, S2, no murmurs, rubs, clicks or gallops      Assessment/ Plan:   Diagnoses and all orders for this visit:    1. Attention deficit hyperactivity disorder (ADHD), combined type  -     Lisdexamfetamine (VYVANSE) 40 mg capsule; Take 1 Cap by mouth daily. Max Daily Amount: 40 mg.  -     Lisdexamfetamine (VYVANSE) 40 mg capsule; Take 1 Cap by mouth daily. Max Daily Amount: 40 mg.  -3 fills     Follow-up and Dispositions    · Return in about 3 months (around 11/13/2019) for add. I have discussed the diagnosis with the patient and the intended plan as seen in the above orders. The patient understands and agrees with the plan. The patient has received an after-visit summary and questions were answered concerning future plans.      Medication Side Effects and Warnings were discussed with patient  Patient Labs were reviewed and or requested:  Patient Past Records were reviewed and or requested    Geno Lyons M.D. There are no Patient Instructions on file for this visit.

## 2019-11-11 ENCOUNTER — OFFICE VISIT (OUTPATIENT)
Dept: FAMILY MEDICINE CLINIC | Age: 24
End: 2019-11-11

## 2019-11-11 VITALS
RESPIRATION RATE: 16 BRPM | TEMPERATURE: 98.2 F | SYSTOLIC BLOOD PRESSURE: 102 MMHG | OXYGEN SATURATION: 99 % | HEIGHT: 74 IN | DIASTOLIC BLOOD PRESSURE: 66 MMHG | BODY MASS INDEX: 27.85 KG/M2 | WEIGHT: 217 LBS | HEART RATE: 62 BPM

## 2019-11-11 DIAGNOSIS — F90.2 ATTENTION DEFICIT HYPERACTIVITY DISORDER (ADHD), COMBINED TYPE: ICD-10-CM

## 2019-11-11 NOTE — PROGRESS NOTES
Chief Complaint   Patient presents with    Attention Deficit Disorder    Medication Refill     1. Have you been to the ER, urgent care clinic since your last visit? Hospitalized since your last visit? No    2. Have you seen or consulted any other health care providers outside of the [address] [place] [address] since your last visit? Include any pap smears or colon screening. No      Chief Complaint   Patient presents with    Attention Deficit Disorder    Medication Refill     He is a 25 y.o. male who presents for evalution. Reviewed PmHx, RxHx, FmHx, SocHx, AllgHx and updated and dated in the chart. Patient Active Problem List    Diagnosis    Attention deficit hyperactivity disorder (ADHD), combined type    Episode of recurrent major depressive disorder ([facility] [address]    Urticaria       Review of Systems - negative except as listed above in the HPI    Objective:     Vitals:    11/11/19 1156   BP: 102/66   Pulse: 62   Resp: 16   Temp: 98.2 °F (36.8 °C)   TempSrc: Oral   SpO2: 99%   Weight: 217 lb (98.4 kg)   Height: 6' 2\" (1.88 m)     Physical Examination: General appearance - alert, well appearing, and in no distress  Chest - clear to auscultation, no wheezes, rales or rhonchi, symmetric air entry    Assessment/ Plan:   Diagnoses and all orders for this visit:    1. Attention deficit hyperactivity disorder (ADHD), combined type  -     Lisdexamfetamine (VYVANSE) 40 mg capsule; Take 1 Cap by mouth daily. Max Daily Amount: 40 mg.  -     Lisdexamfetamine (VYVANSE) 40 mg capsule; Take 1 Cap by mouth daily. Max Daily Amount: 40 mg.  -3 fills       Follow-up and Dispositions    · Return in about 3 months (around 2/11/2020), or add. I have discussed the diagnosis with the patient and the intended plan as seen in the above orders. The patient understands and agrees with the plan. The patient has received an after-visit summary and questions were answered concerning future plans.      Medication Side Effects and Warnings were discussed with patient  Patient Labs were reviewed and or requested:  Patient Past Records were reviewed and or requested    Melina Renee M.D. There are no Patient Instructions on file for this visit.

## 2019-12-19 DIAGNOSIS — F90.2 ATTENTION DEFICIT HYPERACTIVITY DISORDER (ADHD), COMBINED TYPE: ICD-10-CM

## 2019-12-19 NOTE — TELEPHONE ENCOUNTER
Patient called stating that he was seen last month for his ADHD and that his Vyvanse was never sent in. Please advise.  Patient uses [facility] on [address]

## 2020-02-18 ENCOUNTER — OFFICE VISIT (OUTPATIENT)
Dept: FAMILY MEDICINE CLINIC | Age: 25
End: 2020-02-18

## 2020-02-18 VITALS
HEIGHT: 74 IN | DIASTOLIC BLOOD PRESSURE: 71 MMHG | OXYGEN SATURATION: 97 % | TEMPERATURE: 98.1 F | HEART RATE: 72 BPM | RESPIRATION RATE: 15 BRPM | SYSTOLIC BLOOD PRESSURE: 109 MMHG | WEIGHT: 218 LBS | BODY MASS INDEX: 27.98 KG/M2

## 2020-02-18 DIAGNOSIS — F90.2 ATTENTION DEFICIT HYPERACTIVITY DISORDER (ADHD), COMBINED TYPE: ICD-10-CM

## 2020-02-18 NOTE — PROGRESS NOTES
Chief Complaint   Patient presents with    Attention Deficit Disorder    Medication Refill     1. Have you been to the ER, urgent care clinic since your last visit? Hospitalized since your last visit? No    2. Have you seen or consulted any other health care providers outside of the [address] [place] [address] since your last visit? Include any pap smears or colon screening. No        Chief Complaint   Patient presents with    Attention Deficit Disorder    Medication Refill     He is a 25 y.o. male who presents for evalution. Reviewed PmHx, RxHx, FmHx, SocHx, AllgHx and updated and dated in the chart. Patient Active Problem List    Diagnosis    Attention deficit hyperactivity disorder (ADHD), combined type    Episode of recurrent major depressive disorder ([facility] [address]    Urticaria       Review of Systems - negative except as listed above in the HPI    Objective:     Vitals:    02/18/20 1058   BP: 109/71   Pulse: 72   Resp: 15   Temp: 98.1 °F (36.7 °C)   TempSrc: Oral   SpO2: 97%   Weight: 218 lb (98.9 kg)   Height: 6' 2\" (1.88 m)     Physical Examination: General appearance - alert, well appearing, and in no distress  Chest - clear to auscultation, no wheezes, rales or rhonchi, symmetric air entry  Heart - normal rate, regular rhythm, normal S1, S2, no murmurs, rubs, clicks or gallops    Assessment/ Plan:   Diagnoses and all orders for this visit:    1. Attention deficit hyperactivity disorder (ADHD), combined type  -     Lisdexamfetamine (VYVANSE) 40 mg capsule; Take 1 Cap by mouth daily. Max Daily Amount: 40 mg.  -     Lisdexamfetamine (VYVANSE) 40 mg capsule; Take 1 Cap by mouth daily. Max Daily Amount: 40 mg.  -3 fills  -stable       Follow-up and Dispositions    · Return in about 3 months (around 5/18/2020) for add. I have discussed the diagnosis with the patient and the intended plan as seen in the above orders. The patient understands and agrees with the plan.  The patient has received an after-visit summary and questions were answered concerning future plans. Medication Side Effects and Warnings were discussed with patient  Patient Labs were reviewed and or requested:  Patient Past Records were reviewed and or requested    Amy Jordan M.D. There are no Patient Instructions on file for this visit.

## 2020-05-13 ENCOUNTER — VIRTUAL VISIT (OUTPATIENT)
Dept: FAMILY MEDICINE CLINIC | Age: 25
End: 2020-05-13

## 2020-05-13 DIAGNOSIS — F90.2 ATTENTION DEFICIT HYPERACTIVITY DISORDER (ADHD), COMBINED TYPE: ICD-10-CM

## 2020-05-13 NOTE — PROGRESS NOTES
Here for VV    Consent: Tevin Porras, who was seen by synchronous (real-time) audio-video technology, and/or his healthcare decision maker, is aware that this patient-initiated, Telehealth encounter on 5/13/2020 is a billable service, with coverage as determined by his insurance carrier. He is aware that he may receive a bill and has provided verbal consent to proceed: Yes. 712  Subjective:   Tevin Porras is a 22 y.o. male who was seen for No chief complaint on file. Prior to Admission medications    Medication Sig Start Date End Date Taking? Authorizing Provider   Lisdexamfetamine (VYVANSE) 40 mg capsule Take 1 Cap by mouth daily. Max Daily Amount: 40 mg. 5/13/20  Yes Adis Marcial MD   Lisdexamfetamine (VYVANSE) 40 mg capsule Take 1 Cap by mouth daily. Max Daily Amount: 40 mg. 7/13/20  Yes Adis Marcial MD   Lisdexamfetamine (VYVANSE) 40 mg capsule Take 1 Cap by mouth daily. Max Daily Amount: 40 mg. 6/13/20  Yes Adis Marcial MD   Lisdexamfetamine (VYVANSE) 40 mg capsule Take 1 Cap by mouth daily. Max Daily Amount: 40 mg. 2/18/20 5/13/20  Adis Marcial MD   Lisdexamfetamine (VYVANSE) 40 mg capsule Take 1 Cap by mouth daily. Max Daily Amount: 40 mg. 4/18/20 5/13/20  Adis Marcial MD   Cetirizine (ZYRTEC) 10 mg cap One tablet daily 5/8/19   Adis Marcial MD   ibuprofen (MOTRIN) 800 mg tablet Take 1 Tab by mouth every eight (8) hours as needed for Pain.  12/26/17   Preeti Hernández NP     No Known Allergies  Patient Active Problem List    Diagnosis    Attention deficit hyperactivity disorder (ADHD), combined type    Episode of recurrent major depressive disorder ([address]    Urticaria     Patient Active Problem List   Diagnosis Code    Urticaria L50.9    Episode of recurrent major depressive disorder ([address] F33.9    Attention deficit hyperactivity disorder (ADHD), combined type F90.2       ROS    Objective:   Vital Signs: (As obtained by patient/caregiver at home)  There were no vitals taken for this visit. [INSTRUCTIONS:  \"[x]\" Indicates a positive item  \"[]\" Indicates a negative item  -- DELETE ALL ITEMS NOT EXAMINED]    Constitutional: [x] Appears well-developed and well-nourished [x] No apparent distress      [] Abnormal -     Mental status: [x] Alert and awake  [x] Oriented to person/place/time [x] Able to follow commands    [] Abnormal -     Eyes:   EOM    [x]  Normal    [] Abnormal -   Sclera  [x]  Normal    [] Abnormal -          Discharge [x]  None visible   [] Abnormal -     HENT: [x] Normocephalic, atraumatic  [] Abnormal -   [x] Mouth/Throat: Mucous membranes are moist    External Ears [x] Normal  [] Abnormal -    Neck: [x] No visualized mass [] Abnormal -     Pulmonary/Chest: [x] Respiratory effort normal   [x] No visualized signs of difficulty breathing or respiratory distress        [] Abnormal -        Neurological:        [x] No Facial Asymmetry (Cranial nerve 7 motor function) (limited exam due to video visit)          [x] No gaze palsy        [] Abnormal -          Skin:        [x] No significant exanthematous lesions or discoloration noted on facial skin         [] Abnormal -            Psychiatric:       [x] Normal Affect [] Abnormal -        [x] No Hallucinations    Other pertinent observable physical exam findings:-              Assessment & Plan:   Diagnoses and all orders for this visit:    1. Attention deficit hyperactivity disorder (ADHD), combined type  -     Lisdexamfetamine (VYVANSE) 40 mg capsule; Take 1 Cap by mouth daily. Max Daily Amount: 40 mg.  -     Lisdexamfetamine (VYVANSE) 40 mg capsule; Take 1 Cap by mouth daily. Max Daily Amount: 40 mg.  -     Lisdexamfetamine (VYVANSE) 40 mg capsule; Take 1 Cap by mouth daily. Max Daily Amount: 40 mg.  -stable on rx          Follow-up and Dispositions    · Return in about 3 months (around 8/13/2020) for add.                We discussed the expected course, resolution and complications of the diagnosis(es) in detail. Medication risks, benefits, costs, interactions, and alternatives were discussed as indicated. I advised him to contact the office if his condition worsens, changes or fails to improve as anticipated. He expressed understanding with the diagnosis(es) and plan. Rakan Marie is a 22 y.o. male being evaluated by a video visit encounter for concerns as above. A caregiver was present when appropriate. Due to this being a TeleHealth encounter (During [facility] public health emergency), evaluation of the following organ systems was limited: Vitals/Constitutional/EENT/Resp/CV/GI//MS/Neuro/Skin/Heme-Lymph-Imm. Pursuant to the emergency declaration under the [facility], [facility] waiver authority and the [employer] Act, this Virtual  Visit was conducted, with patient's (and/or legal guardian's) consent, to reduce the patient's risk of exposure to COVID-19 and provide necessary medical care. Services were provided through a video synchronous discussion virtually to substitute for in-person clinic visit. Patient and provider were located at their individual homes.         Jerilyn De Paz MD

## 2020-09-22 ENCOUNTER — VIRTUAL VISIT (OUTPATIENT)
Dept: FAMILY MEDICINE CLINIC | Age: 25
End: 2020-09-22
Payer: COMMERCIAL

## 2020-09-22 DIAGNOSIS — F90.2 ATTENTION DEFICIT HYPERACTIVITY DISORDER (ADHD), COMBINED TYPE: ICD-10-CM

## 2020-09-22 DIAGNOSIS — K21.9 GASTROESOPHAGEAL REFLUX DISEASE WITHOUT ESOPHAGITIS: Primary | ICD-10-CM

## 2020-09-22 DIAGNOSIS — F33.9 EPISODE OF RECURRENT MAJOR DEPRESSIVE DISORDER, UNSPECIFIED DEPRESSION EPISODE SEVERITY (HCC): ICD-10-CM

## 2020-09-22 PROCEDURE — 99213 OFFICE O/P EST LOW 20 MIN: CPT | Performed by: FAMILY MEDICINE

## 2020-09-22 RX ORDER — PANTOPRAZOLE SODIUM 40 MG/1
40 TABLET, DELAYED RELEASE ORAL DAILY
Qty: 30 TAB | Refills: 2 | Status: SHIPPED | OUTPATIENT
Start: 2020-09-22 | End: 2022-08-29

## 2020-09-22 NOTE — PROGRESS NOTES
Patient is here today with complaints of increased upper abdominal pain with GERD would like to try an acid. He has history of this in the family as well with him in his youth. In addition needs refills of his Vyvanse. Consent: Mikael Mcduffie, who was seen by synchronous (real-time) audio-video technology, and/or his healthcare decision maker, is aware that this patient-initiated, Telehealth encounter on 9/22/2020 is a billable service, with coverage as determined by his insurance carrier. He is aware that he may receive a bill and has provided verbal consent to proceed: YES-Consent obtained within past 12 months        712  Subjective:   Mikeal Mcduffie is a 22 y.o. male who was seen for No chief complaint on file. Prior to Admission medications    Medication Sig Start Date End Date Taking? Authorizing Provider   pantoprazole (PROTONIX) 40 mg tablet Take 1 Tab by mouth daily. 9/22/20  Yes Leon Anne MD   Lisdexamfetamine (VYVANSE) 40 mg capsule Take 1 Cap by mouth daily. Max Daily Amount: 40 mg. 9/22/20  Yes Leon Anne MD   Lisdexamfetamine (VYVANSE) 40 mg capsule Take 1 Cap by mouth daily. Max Daily Amount: 40 mg. 11/22/20  Yes Leon Anne MD   Lisdexamfetamine (VYVANSE) 40 mg capsule Take 1 Cap by mouth daily. Max Daily Amount: 40 mg. 10/22/20  Yes Leon Anne MD   Lisdexamfetamine (VYVANSE) 40 mg capsule Take 1 Cap by mouth daily. Max Daily Amount: 40 mg. 5/13/20 9/22/20  Leon Anne MD   Lisdexamfetamine (VYVANSE) 40 mg capsule Take 1 Cap by mouth daily. Max Daily Amount: 40 mg. 7/13/20 9/22/20  Leon Anne MD   Lisdexamfetamine (VYVANSE) 40 mg capsule Take 1 Cap by mouth daily. Max Daily Amount: 40 mg. 6/13/20 9/22/20  Leon Anne MD   Cetirizine (ZYRTEC) 10 mg cap One tablet daily 5/8/19   Leon Anne MD   ibuprofen (MOTRIN) 800 mg tablet Take 1 Tab by mouth every eight (8) hours as needed for Pain.  12/26/17   Lisbet Villegas NP     No Known Allergies    ROS    Objective:   Vital Signs: (As obtained by patient/caregiver at home)  There were no vitals taken for this visit. [INSTRUCTIONS:  \"[x]\" Indicates a positive item  \"[]\" Indicates a negative item  -- DELETE ALL ITEMS NOT EXAMINED]    Constitutional: [x] Appears well-developed and well-nourished [x] No apparent distress      [] Abnormal -     Mental status: [x] Alert and awake  [x] Oriented to person/place/time [x] Able to follow commands    [] Abnormal -     Eyes:   EOM    [x]  Normal    [] Abnormal -   Sclera  [x]  Normal    [] Abnormal -          Discharge [x]  None visible   [] Abnormal -     HENT: [x] Normocephalic, atraumatic  [] Abnormal -   [x] Mouth/Throat: Mucous membranes are moist    External Ears [x] Normal  [] Abnormal -    Neck: [x] No visualized mass [] Abnormal -     Pulmonary/Chest: [x] Respiratory effort normal   [x] No visualized signs of difficulty breathing or respiratory distress        [] Abnormal -        Neurological:        [x] No Facial Asymmetry (Cranial nerve 7 motor function) (limited exam due to video visit)          [x] No gaze palsy        [] Abnormal -          Skin:        [x] No significant exanthematous lesions or discoloration noted on facial skin         [] Abnormal -            Psychiatric:       [x] Normal Affect [] Abnormal -        [x] No Hallucinations    Other pertinent observable physical exam findings:-              Assessment & Plan:   Diagnoses and all orders for this visit:    1. Gastroesophageal reflux disease without esophagitis  -     pantoprazole (PROTONIX) 40 mg tablet; Take 1 Tab by mouth daily.  -We will add new medication'  2. Attention deficit hyperactivity disorder (ADHD), combined type  -     Lisdexamfetamine (VYVANSE) 40 mg capsule; Take 1 Cap by mouth daily. Max Daily Amount: 40 mg.  -     Lisdexamfetamine (VYVANSE) 40 mg capsule; Take 1 Cap by mouth daily.  Max Daily Amount: 40 mg.  -     Lisdexamfetamine (VYVANSE) 40 mg capsule; Take 1 Cap by mouth daily. Max Daily Amount: 40 mg.  -Stable with Vyvanse    3. Episode of recurrent major depressive disorder, unspecified depression episode severity ([address]  -Stable        Follow-up and Dispositions    · Return in about 3 months (around 12/22/2020) for add. We discussed the expected course, resolution and complications of the diagnosis(es) in detail. Medication risks, benefits, costs, interactions, and alternatives were discussed as indicated. I advised him to contact the office if his condition worsens, changes or fails to improve as anticipated. He expressed understanding with the diagnosis(es) and plan. Kavita Chinchilla is a 22 y.o. male being evaluated by a video visit encounter for concerns as above. A caregiver was present when appropriate. Due to this being a TeleHealth encounter (During [facility] public health emergency), evaluation of the following organ systems was limited: Vitals/Constitutional/EENT/Resp/CV/GI//MS/Neuro/Skin/Heme-Lymph-Imm. Pursuant to the emergency declaration under the [facility], 1135 waiver authority and the [employer] Act, this Virtual  Visit was conducted, with patient's (and/or legal guardian's) consent, to reduce the patient's risk of exposure to COVID-19 and provide necessary medical care. Services were provided through a video synchronous discussion virtually to substitute for in-person clinic visit. Patient and provider were located at their individual homes.         Ramona Beach MD

## 2021-09-09 ENCOUNTER — VIRTUAL VISIT (OUTPATIENT)
Dept: FAMILY MEDICINE CLINIC | Age: 26
End: 2021-09-09
Payer: COMMERCIAL

## 2021-09-09 DIAGNOSIS — F90.2 ATTENTION DEFICIT HYPERACTIVITY DISORDER (ADHD), COMBINED TYPE: ICD-10-CM

## 2021-09-09 PROCEDURE — 99213 OFFICE O/P EST LOW 20 MIN: CPT | Performed by: FAMILY MEDICINE

## 2021-09-09 NOTE — PROGRESS NOTES
Consent: Brigid Zepeda, who was seen by synchronous (real-time) audio-video technology, and/or his healthcare decision maker, is aware that this patient-initiated, Telehealth encounter on 9/9/2021 is a billable service, with coverage as determined by his insurance carrier. He is aware that he may receive a bill and has provided verbal consent to proceed: YES-Consent obtained within past 12 months  712    Prior to Admission medications    Medication Sig Start Date End Date Taking? Authorizing Provider   Lisdexamfetamine (VYVANSE) 40 mg capsule Take 1 Capsule by mouth daily. Max Daily Amount: 40 mg. 9/9/21  Yes Antonietta Hart MD   Lisdexamfetamine (VYVANSE) 40 mg capsule Take 1 Capsule by mouth daily. Max Daily Amount: 40 mg. 11/9/21  Yes Antonietta Hart MD   Lisdexamfetamine (VYVANSE) 40 mg capsule Take 1 Capsule by mouth daily. Max Daily Amount: 40 mg. 10/9/21  Yes Antonietta Hart MD   pantoprazole (PROTONIX) 40 mg tablet Take 1 Tab by mouth daily. 9/22/20   Antonietta Hart MD   Lisdexamfetamine (VYVANSE) 40 mg capsule Take 1 Cap by mouth daily. Max Daily Amount: 40 mg. 9/22/20 9/9/21  Antonietta Hart MD   Lisdexamfetamine (VYVANSE) 40 mg capsule Take 1 Cap by mouth daily. Max Daily Amount: 40 mg. 11/22/20 9/9/21  Antonietta Hart MD   Lisdexamfetamine (VYVANSE) 40 mg capsule Take 1 Cap by mouth daily. Max Daily Amount: 40 mg. 10/22/20 9/9/21  Antonietta Hart MD   Cetirizine (ZYRTEC) 10 mg cap One tablet daily 5/8/19   Antonietta Hart MD   ibuprofen (MOTRIN) 800 mg tablet Take 1 Tab by mouth every eight (8) hours as needed for Pain. 12/26/17   Chinyere Romano NP     No Known Allergies        Assessment & Plan:   Diagnoses and all orders for this visit:    1. Attention deficit hyperactivity disorder (ADHD), combined type  -     Lisdexamfetamine (VYVANSE) 40 mg capsule; Take 1 Capsule by mouth daily. Max Daily Amount: 40 mg.  -     Lisdexamfetamine (VYVANSE) 40 mg capsule;  Take 1 Capsule by mouth daily. Max Daily Amount: 40 mg.  -     Lisdexamfetamine (VYVANSE) 40 mg capsule; Take 1 Capsule by mouth daily. Max Daily Amount: 40 mg.        Medication Side Effects and Warnings were discussed with patient  Patient Labs were reviewed and or requested:  Patient Past Records were reviewed and or requested              We discussed the expected course, resolution and complications of the diagnosis(es) in detail. Medication risks, benefits, costs, interactions, and alternatives were discussed as indicated. I advised him to contact the office if his condition worsens, changes or fails to improve as anticipated. He expressed understanding with the diagnosis(es) and plan. Tootie Murray is a 32 y.o. male being evaluated by a video visit encounter for concerns as above. A caregiver was present when appropriate. Due to this being a TeleHealth encounter (During [facility]-30 public health emergency), evaluation of the following organ systems was limited: Vitals/Constitutional/EENT/Resp/CV/GI//MS/Neuro/Skin/Heme-Lymph-Imm. Pursuant to the emergency declaration under the [facility][note 3][facility], [facility] waiver authority and the [employer] and [employer] Act, this Virtual  Visit was conducted, with patient's (and/or legal guardian's) consent, to reduce the patient's risk of exposure to COVID-19 and provide necessary medical care. Services were provided through a video synchronous discussion virtually to substitute for in-person clinic visit. Patient and provider were located at their individual homes. I have discussed the diagnosis with the patient and the intended plan as seen in the above orders. The patient understands and agrees with the plan. The patient has received an after-visit summary and questions were answered concerning future plans.      Medication Side Effects and Warnings were discussed with patient  Patient Labs were reviewed and or requested:  Patient Past Records were reviewed and or requested    Chris Page M.D. There are no Patient Instructions on file for this visit. Consent: Barbara Davis, who was seen by synchronous (real-time) audio-video technology, and/or his healthcare decision maker, is aware that this patient-initiated, Telehealth encounter on 9/9/2021 is a billable service, with coverage as determined by his insurance carrier. He is aware that he may receive a bill and has provided verbal consent to proceed: YES-Consent obtained within past 12 months  712    Prior to Admission medications    Medication Sig Start Date End Date Taking? Authorizing Provider   Lisdexamfetamine (VYVANSE) 40 mg capsule Take 1 Capsule by mouth daily. Max Daily Amount: 40 mg. 9/9/21  Yes Abbi Licona MD   Lisdexamfetamine (VYVANSE) 40 mg capsule Take 1 Capsule by mouth daily. Max Daily Amount: 40 mg. 11/9/21  Yes Abbi Licona MD   Lisdexamfetamine (VYVANSE) 40 mg capsule Take 1 Capsule by mouth daily. Max Daily Amount: 40 mg. 10/9/21  Yes Abbi Licona MD   pantoprazole (PROTONIX) 40 mg tablet Take 1 Tab by mouth daily. 9/22/20   Abbi Licona MD   Lisdexamfetamine (VYVANSE) 40 mg capsule Take 1 Cap by mouth daily. Max Daily Amount: 40 mg. 9/22/20 9/9/21  Abbi Licona MD   Lisdexamfetamine (VYVANSE) 40 mg capsule Take 1 Cap by mouth daily. Max Daily Amount: 40 mg. 11/22/20 9/9/21  Abbi Licona MD   Lisdexamfetamine (VYVANSE) 40 mg capsule Take 1 Cap by mouth daily. Max Daily Amount: 40 mg. 10/22/20 9/9/21  Abbi Licona MD   Cetirizine (ZYRTEC) 10 mg cap One tablet daily 5/8/19   Abbi Licona MD   ibuprofen (MOTRIN) 800 mg tablet Take 1 Tab by mouth every eight (8) hours as needed for Pain. 12/26/17   Pérez Reason, NP     No Known Allergies        Assessment & Plan:   Diagnoses and all orders for this visit:    1.  Attention deficit hyperactivity disorder (ADHD), combined type  - Lisdexamfetamine (VYVANSE) 40 mg capsule; Take 1 Capsule by mouth daily. Max Daily Amount: 40 mg.  -     Lisdexamfetamine (VYVANSE) 40 mg capsule; Take 1 Capsule by mouth daily. Max Daily Amount: 40 mg.  -     Lisdexamfetamine (VYVANSE) 40 mg capsule; Take 1 Capsule by mouth daily. Max Daily Amount: 40 mg. Patient has been out of medicine secondary to not having a job as got a new job would like to restart medication. Medication Side Effects and Warnings were discussed with patient  Patient Labs were reviewed and or requested:  Patient Past Records were reviewed and or requested              We discussed the expected course, resolution and complications of the diagnosis(es) in detail. Medication risks, benefits, costs, interactions, and alternatives were discussed as indicated. I advised him to contact the office if his condition worsens, changes or fails to improve as anticipated. He expressed understanding with the diagnosis(es) and plan. Porter Martinez is a 32 y.o. male being evaluated by a video visit encounter for concerns as above. A caregiver was present when appropriate. Due to this being a TeleHealth encounter (During [facility]- public health emergency), evaluation of the following organ systems was limited: Vitals/Constitutional/EENT/Resp/CV/GI//MS/Neuro/Skin/Heme-Lymph-Imm. Pursuant to the emergency declaration under the [address] [place] [address], [facility] waiver authority and the [employer] and [employer] Act, this Virtual  Visit was conducted, with patient's (and/or legal guardian's) consent, to reduce the patient's risk of exposure to COVID-19 and provide necessary medical care. Services were provided through a video synchronous discussion virtually to substitute for in-person clinic visit. Patient and provider were located at their individual homes.     I have discussed the diagnosis with the patient and the intended plan as seen in the above orders. The patient understands and agrees with the plan. The patient has received an after-visit summary and questions were answered concerning future plans. Medication Side Effects and Warnings were discussed with patient  Patient Labs were reviewed and or requested:  Patient Past Records were reviewed and or requested    Chris Page M.D. There are no Patient Instructions on file for this visit.

## 2021-09-30 ENCOUNTER — NURSE TRIAGE (OUTPATIENT)
Dept: OTHER | Facility: CLINIC | Age: 26
End: 2021-09-30

## 2021-09-30 NOTE — TELEPHONE ENCOUNTER
Reason for Disposition   Ear is painful    Answer Assessment - Initial Assessment Questions  1. DESCRIPTION: \"Describe the sound you are hearing. \" (e.g., hissing, humming, pounding, ringing)     High pitched ring     2. LOCATION: \"One or both ears? \" If one, ask: \"Which ear? \"     Unsure     3. SEVERITY: \"How bad is it? \"     - MILD - doesn't interfere with normal activities, only can hear in a quiet room     - MODERATE-SEVERE (Bothersome): interferes with work, school, sleep, or other activities      \"depends on what happens\"- Moderate - \"interferes with work\"     4. ONSET: \"When did this begin? \" \"Did it start suddenly or come on gradually? \"      Started 3-4 years ago- gradual 'worse over time\"     5. PATTERN: \"Does this come and go, or has it been constant since it started? \"      Comes and goes     6. HEARING LOSS: \"Is your hearing decreased? \" (e.g., normal, decreased)        Hearing decreased \"only when I hear the high pitched noise\"     7. OTHER SYMPTOMS: \"Do you have any other symptoms? \" (e.g., dizziness, earache)     \"sometimes I feel discombobulated\" sometimes a headache on the top left or top right of the head- sometimes the whole front of the head- no headache at time of triage ear pain this morning - left ear     8. PREGNANCY: \"Is there any chance you are pregnant? \" \"When was your last menstrual period? \"      N/A    Protocols used: TINNITUS-ADULT-AH  Received call from at [facility] with Red Flag Complaint. Brief description of triage: ringing in the ears- intermittent for the last 3-4 years. Pt reports that he is now having headaches that come and go with ear pain. Triage indicates for patient to See PCP in 24 hours; advised pt to follow up in [facility] or ED if no appointments available. Care advice provided, patient verbalizes understanding; denies any other questions or concerns; instructed to call back for any new or worsening symptoms.     Writer provided warm transfer to Silke Goldman  at [facility] for appointment scheduling. Attention Provider: Thank you for allowing me to participate in the care of your patient. The patient was connected to triage in response to information provided to the [facility]. Please do not respond through this encounter as the response is not directed to a shared pool.

## 2021-10-11 ENCOUNTER — OFFICE VISIT (OUTPATIENT)
Dept: FAMILY MEDICINE CLINIC | Age: 26
End: 2021-10-11
Payer: COMMERCIAL

## 2021-10-11 VITALS
SYSTOLIC BLOOD PRESSURE: 117 MMHG | BODY MASS INDEX: 32.73 KG/M2 | OXYGEN SATURATION: 100 % | TEMPERATURE: 97.9 F | WEIGHT: 255 LBS | DIASTOLIC BLOOD PRESSURE: 76 MMHG | HEIGHT: 74 IN | HEART RATE: 70 BPM | RESPIRATION RATE: 16 BRPM

## 2021-10-11 DIAGNOSIS — M54.2 NECK PAIN: ICD-10-CM

## 2021-10-11 DIAGNOSIS — R51.9 NONINTRACTABLE HEADACHE, UNSPECIFIED CHRONICITY PATTERN, UNSPECIFIED HEADACHE TYPE: Primary | ICD-10-CM

## 2021-10-11 DIAGNOSIS — F90.2 ATTENTION DEFICIT HYPERACTIVITY DISORDER (ADHD), COMBINED TYPE: ICD-10-CM

## 2021-10-11 PROCEDURE — 99214 OFFICE O/P EST MOD 30 MIN: CPT | Performed by: FAMILY MEDICINE

## 2021-10-11 RX ORDER — NAPROXEN 500 MG/1
500 TABLET ORAL 2 TIMES DAILY WITH MEALS
Qty: 60 TABLET | Refills: 2 | Status: SHIPPED | OUTPATIENT
Start: 2021-10-11 | End: 2022-01-20

## 2021-10-11 NOTE — PROGRESS NOTES
Chief Complaint   Patient presents with    Headache     X 2 months    Ringing in Ear     [employer]    Back Pain    Neck Pain     1. Have you been to the ER, urgent care clinic since your last visit? Hospitalized since your last visit? No    2. Have you seen or consulted any other health care providers outside of the [address] [place] [address] since your last visit? Include any pap smears or colon screening. No         Chief Complaint   Patient presents with    Headache     X 2 months    Ringing in Ear     Field artillery    Back Pain    Neck Pain     He is a 32 y.o. male who presents for evalution. Reviewed PmHx, RxHx, FmHx, SocHx, AllgHx and updated and dated in the chart. Patient Active Problem List    Diagnosis    Attention deficit hyperactivity disorder (ADHD), combined type    Episode of recurrent major depressive disorder ([facility] [address]    Urticaria       Review of Systems - negative except as listed above in the HPI    Objective:     Vitals:    10/11/21 1603   BP: 117/76   Pulse: 70   Resp: 16   Temp: 97.9 °F (36.6 °C)   TempSrc: Oral   SpO2: 100%   Weight: 255 lb (115.7 kg)   Height: 6' 2\" (1.88 m)         Assessment/ Plan:   Diagnoses and all orders for this visit:    1. Nonintractable headache, unspecified chronicity pattern, unspecified headache type  Patient having headaches increasing over the last 6 to 8 weeks. With daily headaches intensified with the start of Vyvanse. This time going to hold Vyvanse for the next couple weeks patient is to send me a MyChart messages letting me know how he is doing. 2. Neck pain  -     naproxen (NAPROSYN) 500 mg tablet; Take 1 Tablet by mouth two (2) times daily (with meals). Patient having neck pain off and on for last couple of months. Please have muscle spasms and cervical pain. Add Naprosyn as above twice a day for 10 days return to office if not better. Discussed patient range of motion exercises.   3. Attention deficit hyperactivity disorder (ADHD), combined type   Hold Vyvanse at this time to see if this is possibly contributing to his headaches and will replace if need be. I have discussed the diagnosis with the patient and the intended plan as seen in the above orders. The patient understands and agrees with the plan. The patient has received an after-visit summary and questions were answered concerning future plans. Medication Side Effects and Warnings were discussed with patient  Patient Labs were reviewed and or requested:  Patient Past Records were reviewed and or requested    Su Matson M.D. There are no Patient Instructions on file for this visit.

## 2021-12-09 ENCOUNTER — VIRTUAL VISIT (OUTPATIENT)
Dept: FAMILY MEDICINE CLINIC | Age: 26
End: 2021-12-09
Payer: COMMERCIAL

## 2021-12-09 DIAGNOSIS — F33.9 EPISODE OF RECURRENT MAJOR DEPRESSIVE DISORDER, UNSPECIFIED DEPRESSION EPISODE SEVERITY (HCC): ICD-10-CM

## 2021-12-09 DIAGNOSIS — F90.2 ATTENTION DEFICIT HYPERACTIVITY DISORDER (ADHD), COMBINED TYPE: ICD-10-CM

## 2021-12-09 DIAGNOSIS — R06.83 SNORING: ICD-10-CM

## 2021-12-09 DIAGNOSIS — R51.9 NONINTRACTABLE HEADACHE, UNSPECIFIED CHRONICITY PATTERN, UNSPECIFIED HEADACHE TYPE: Primary | ICD-10-CM

## 2021-12-09 PROCEDURE — 99214 OFFICE O/P EST MOD 30 MIN: CPT | Performed by: FAMILY MEDICINE

## 2021-12-09 RX ORDER — LORATADINE 10 MG/1
10 TABLET ORAL DAILY
Qty: 30 TABLET | Refills: 11 | Status: SHIPPED | OUTPATIENT
Start: 2021-12-09 | End: 2022-12-04

## 2021-12-09 NOTE — PROGRESS NOTES
Consent: Estella Salinas, who was seen by synchronous (real-time) audio-video technology, and/or his healthcare decision maker, is aware that this patient-initiated, Telehealth encounter on 12/9/2021 is a billable service, with coverage as determined by his insurance carrier. He is aware that he may receive a bill and has provided verbal consent to proceed: YES-Consent obtained within past 12 months  712    Prior to Admission medications    Medication Sig Start Date End Date Taking? Authorizing Provider   Lisdexamfetamine (VYVANSE) 40 mg capsule Take 1 Capsule by mouth daily for 29 days. Max Daily Amount: 40 mg. 12/9/21 1/7/22 Yes Lorie Espinal MD   Lisdexamfetamine (VYVANSE) 40 mg capsule Take 1 Capsule by mouth daily for 29 days. Max Daily Amount: 40 mg. 1/8/22 2/6/22 Yes Lorie Espinal MD   Lisdexamfetamine (VYVANSE) 40 mg capsule Take 1 Capsule by mouth daily for 29 days. Max Daily Amount: 40 mg. 2/7/22 3/8/22 Yes Lorie Espinal MD   loratadine (Claritin) 10 mg tablet Take 1 Tablet by mouth daily for 360 days. 12/9/21 12/4/22 Yes Lorie Espinal MD   naproxen (NAPROSYN) 500 mg tablet Take 1 Tablet by mouth two (2) times daily (with meals). 10/11/21   Lorie Espinal MD   Lisdexamfetamine (VYVANSE) 40 mg capsule Take 1 Capsule by mouth daily. Max Daily Amount: 40 mg. 9/9/21   Lorie Espinal MD   Lisdexamfetamine (VYVANSE) 40 mg capsule Take 1 Capsule by mouth daily. Max Daily Amount: 40 mg. 11/9/21   Lorie Espinal MD   Lisdexamfetamine (VYVANSE) 40 mg capsule Take 1 Capsule by mouth daily. Max Daily Amount: 40 mg. 10/9/21   Lorie Espinal MD   pantoprazole (PROTONIX) 40 mg tablet Take 1 Tab by mouth daily. Patient not taking: Reported on 10/11/2021 9/22/20   Lorie Espinal MD   Cetirizine (ZYRTEC) 10 mg cap One tablet daily 5/8/19   Lorie Espinal MD   ibuprofen (MOTRIN) 800 mg tablet Take 1 Tab by mouth every eight (8) hours as needed for Pain.  12/26/17   Piper Mensah NP     No Known Allergies        Assessment & Plan:   Diagnoses and all orders for this visit:    1. Nonintractable headache, unspecified chronicity pattern, unspecified headache type  Patient continued having headaches and believe is stress related. He states that Naprosyn does work. He does not want to take anxiety medicine at this time but is seeking out counseling. 2. Episode of recurrent major depressive disorder, unspecified depression episode severity ([address]  As above  3. Snoring  -     REFERRAL TO SLEEP STUDIES    4. Attention deficit hyperactivity disorder (ADHD), combined type  -     Lisdexamfetamine (VYVANSE) 40 mg capsule; Take 1 Capsule by mouth daily for 29 days. Max Daily Amount: 40 mg.  -     Lisdexamfetamine (VYVANSE) 40 mg capsule; Take 1 Capsule by mouth daily for 29 days. Max Daily Amount: 40 mg.  -     Lisdexamfetamine (VYVANSE) 40 mg capsule; Take 1 Capsule by mouth daily for 29 days. Max Daily Amount: 40 mg.  -     REFERRAL TO SLEEP STUDIES  Stable on current meds  Other orders  -     loratadine (Claritin) 10 mg tablet; Take 1 Tablet by mouth daily for 360 days. Add medicine for occasional hives and allergies. Medication Side Effects and Warnings were discussed with patient  Patient Labs were reviewed and or requested:  Patient Past Records were reviewed and or requested    Follow-up and Dispositions    · Return in about 3 months (around 3/9/2022). We discussed the expected course, resolution and complications of the diagnosis(es) in detail. Medication risks, benefits, costs, interactions, and alternatives were discussed as indicated. I advised him to contact the office if his condition worsens, changes or fails to improve as anticipated. He expressed understanding with the diagnosis(es) and plan. Ale Taylor is a 32 y.o. male being evaluated by a video visit encounter for concerns as above. A caregiver was present when appropriate.  Due to this being a TeleHealth encounter (During [facility]-31 public health emergency), evaluation of the following organ systems was limited: Vitals/Constitutional/EENT/Resp/CV/GI//MS/Neuro/Skin/Heme-Lymph-Imm. Pursuant to the emergency declaration under the [facility], [facility] waiver authority and the [employer] and [employer] Act, this Virtual  Visit was conducted, with patient's (and/or legal guardian's) consent, to reduce the patient's risk of exposure to COVID-19 and provide necessary medical care. Services were provided through a video synchronous discussion virtually to substitute for in-person clinic visit. Patient and provider were located at their individual homes. I have discussed the diagnosis with the patient and the intended plan as seen in the above orders. The patient understands and agrees with the plan. The patient has received an after-visit summary and questions were answered concerning future plans. Medication Side Effects and Warnings were discussed with patient  Patient Labs were reviewed and or requested:  Patient Past Records were reviewed and or requested    Melisa Sheridan M.D. There are no Patient Instructions on file for this visit.

## 2021-12-20 ENCOUNTER — DOCUMENTATION ONLY (OUTPATIENT)
Dept: SLEEP MEDICINE | Age: 26
End: 2021-12-20

## 2021-12-20 ENCOUNTER — VIRTUAL VISIT (OUTPATIENT)
Dept: SLEEP MEDICINE | Age: 26
End: 2021-12-20
Payer: COMMERCIAL

## 2021-12-20 VITALS — WEIGHT: 240 LBS | HEIGHT: 74 IN | BODY MASS INDEX: 30.8 KG/M2

## 2021-12-20 DIAGNOSIS — G47.33 OSA (OBSTRUCTIVE SLEEP APNEA): Primary | ICD-10-CM

## 2021-12-20 DIAGNOSIS — F90.2 ATTENTION DEFICIT HYPERACTIVITY DISORDER (ADHD), COMBINED TYPE: ICD-10-CM

## 2021-12-20 PROCEDURE — 99203 OFFICE O/P NEW LOW 30 MIN: CPT | Performed by: INTERNAL MEDICINE

## 2021-12-20 NOTE — PATIENT INSTRUCTIONS
217 [facility] [place]. [place], [address]  Tel.  [phone]  Fax. 100 [facility] 60  [place], [address] [facility]  Tel.  [phone]  Fax. [phone] [address]  Tel.  [phone]  Fax. [phone]     Sleep Apnea: After Your Visit  Your Care Instructions  Sleep apnea occurs when you frequently stop breathing for 10 seconds or longer during sleep. It can be mild to severe, based on the number of times per hour that you stop breathing or have slowed breathing. Blocked or narrowed airways in your nose, mouth, or throat can cause sleep apnea. Your airway can become blocked when your throat muscles and tongue relax during sleep. Sleep apnea is common, occurring in 1 out of 20 individuals. Individuals having any of the following characteristics should be evaluated and treated right away due to high risk and detrimental consequences from untreated sleep apnea:  1. Obesity  2. Congestive Heart failure  3. Atrial Fibrillation  4. Uncontrolled Hypertension  5. Type II Diabetes  6. Night-time Arrhythmias  7. Stroke  8. Pulmonary Hypertension  9. High-risk Driving Populations (pilots, truck drivers, etc.)  10. Patients Considering Weight-loss Surgery    How do you know you have sleep apnea? You probably have sleep apnea if you answer 'yes' to 3 or more of the following questions:  S - Have you been told that you Snore? T - Are you often Tired during the day? O - Has anyone Observed you stop breathing while sleeping? P- Do you have (or are being treated for) high blood Pressure? B - Are you obese (Body Mass Index > 35)? A - Is your Age 48years old or older? N - Is your Neck size greater than 16 inches? G - Are you male Gender? A sleep physician can prescribe a breathing device that prevents tissues in the throat from blocking your airway.  Or your doctor may recommend using a dental device (oral breathing device) to help keep your airway open. In some cases, surgery may be needed to remove enlarged tissues in the throat. Follow-up care is a key part of your treatment and safety. Be sure to make and go to all appointments, and call your doctor if you are having problems. It's also a good idea to know your test results and keep a list of the medicines you take. How can you care for yourself at home? · Lose weight, if needed. It may reduce the number of times you stop breathing or have slowed breathing. · Go to bed at the same time every night. · Sleep on your side. It may stop mild apnea. If you tend to roll onto your back, sew a pocket in the back of your pajama top. Put a tennis ball into the pocket, and stitch the pocket shut. This will help keep you from sleeping on your back. · Avoid alcohol and medicines such as sleeping pills and sedatives before bed. · Do not smoke. Smoking can make sleep apnea worse. If you need help quitting, talk to your doctor about stop-smoking programs and medicines. These can increase your chances of quitting for good. · Prop up the head of your bed 4 to 6 inches by putting bricks under the legs of the bed. · Treat breathing problems, such as a stuffy nose, caused by a cold or allergies. · Use a continuous positive airway pressure (CPAP) breathing machine if lifestyle changes do not help your apnea and your doctor recommends it. The machine keeps your airway from closing when you sleep. · If CPAP does not help you, ask your doctor whether you should try other breathing machines. A bilevel positive airway pressure machine has two types of air pressureâone for breathing in and one for breathing out. Another device raises or lowers air pressure as needed while you breathe. · If your nose feels dry or bleeds when using one of these machines, talk with your doctor about increasing moisture in the air. A humidifier may help.   · If your nose is runny or stuffy from using a breathing machine, talk with your doctor about using decongestants or a corticosteroid nasal spray. When should you call for help? Watch closely for changes in your health, and be sure to contact your doctor if:  · You still have sleep apnea even though you have made lifestyle changes. · You are thinking of trying a device such as CPAP. · You are having problems using a CPAP or similar machine. Where can you learn more? Go to My-Hammer. Enter W457 in the search box to learn more about \"Sleep Apnea: After Your Visit. \"   © 1438-3691 Healthwise, Incorporated. Care instructions adapted under license by [facility] (which disclaims liability or warranty for this information). This care instruction is for use with your licensed healthcare professional. If you have questions about a medical condition or this instruction, always ask your healthcare professional. Bjorn Garcia any warranty or liability for your use of this information. PROPER SLEEP HYGIENE    What to avoid  · Do not have drinks with caffeine, such as coffee or black tea, for 8 hours before bed. · Do not smoke or use other types of tobacco near bedtime. Nicotine is a stimulant and can keep you awake. · Avoid drinking alcohol late in the evening, because it can cause you to wake in the middle of the night. · Do not eat a big meal close to bedtime. If you are hungry, eat a light snack. · Do not drink a lot of water close to bedtime, because the need to urinate may wake you up during the night. · Do not read or watch TV in bed. Use the bed only for sleeping and sexual activity. What to try  · Go to bed at the same time every night, and wake up at the same time every morning. Do not take naps during the day. · Keep your bedroom quiet, dark, and cool. · Get regular exercise, but not within 3 to 4 hours of your bedtime. .  · Sleep on a comfortable pillow and mattress.   · If watching the clock makes you anxious, turn it facing away from you so you cannot see the time. · If you worry when you lie down, start a worry book. Well before bedtime, write down your worries, and then set the book and your concerns aside. · Try meditation or other relaxation techniques before you go to bed. · If you cannot fall asleep, get up and go to another room until you feel sleepy. Do something relaxing. Repeat your bedtime routine before you go to bed again. · Make your house quiet and calm about an hour before bedtime. Turn down the lights, turn off the TV, log off the computer, and turn down the volume on music. This can help you relax after a busy day. Drowsy Driving  The [address] [place] [address] Road Traffic Safety Administration cites drowsiness as a causing factor in more than 265,844 police reported crashes annually, resulting in 76,000 injuries and 1,500 deaths. Other surveys suggest 55% of people polled have driven while drowsy in the past year, 23% had fallen asleep but not crashed, 3% crashed, and 2% had and accident due to drowsy driving. Who is at risk? Young Drivers: One study of drowsy driving accidents states that 55% of the drivers were under 25 years. Of those, 75% were male. Shift Workers and Travelers: People who work overnight or travel across time zones frequently are at higher risk of experiencing Circadian Rhythm Disorders. They are trying to work and function when their body is programed to sleep. Sleep Deprived: Lack of sleep has a serious impact on your ability to pay attention or focus on a task. Consistently getting less than the average of 8 hours your body needs creates partial or cumulative sleep deprivation. Untreated Sleep Disorders: Sleep Apnea, Narcolepsy, R.L.S., and other sleep disorders (untreated) prevent a person from getting enough restful sleep. This leads to excessive daytime sleepiness and increases the risk for drowsy driving accidents by up to 7 times.   Medications / Alcohol: Even over the counter medications can cause drowsiness. Medications that impair a drivers attention should have a warning label. Alcohol naturally makes you sleepy and on its own can cause accidents. Combined with excessive drowsiness its effects are amplified. Signs of Drowsy Driving:   * You don't remember driving the last few miles   * You may drift out of your chasity   * You are unable to focus and your thoughts wander   * You may yawn more often than normal   * You have difficulty keeping your eyes open / nodding off   * Missing traffic signs, speeding, or tailgating  Prevention-   Good sleep hygiene, lifestyle and behavioral choices have the most impact on drowsy driving. There is no substitute for sleep and the average person requires 8 hours nightly. If you find yourself driving drowsy, stop and sleep. Consider the sleep hygiene tips provided during your visit as well. Medication Refill Policy: Refills for all medications require 1 week advance notice. Please have your pharmacy fax a refill request. We are unable to fax, or call in \"controled substance\" medications and you will need to pick these prescriptions up from our office. CodaMation Activation    Thank you for requesting access to [employer]oda[employer]ation. Please follow the instructions below to securely access and download your online medical record. CodaMation allows you to send messages to your doctor, view your test results, renew your prescriptions, schedule appointments, and more. How Do I Sign Up? 1. In your internet browser, go to [email or web address]. 2. Click on the First Time User? Click Here link in the Sign In box. You will see the New Member Sign Up page. 3. Enter your CodaMation Access Code exactly as it appears below. You will not need to use this code after youve completed the sign-up process. If you do not sign up before the expiration date, you must request a new code. CodaMation Access Code:  Activation code not generated  Current CodaMation Status: Active (This is the date your M-KOPA access code will )    4. Enter the last four digits of your Social Security Number (xxxx) and Date of Birth (mm/dd/yyyy) as indicated and click Submit. You will be taken to the next sign-up page. 5. Create a M-KOPA ID. This will be your M-KOPA login ID and cannot be changed, so think of one that is secure and easy to remember. 6. Create a M-KOPA password. You can change your password at any time. 7. Enter your Password Reset Question and Answer. This can be used at a later time if you forget your password. 8. Enter your e-mail address. You will receive e-mail notification when new information is available in [address] 9. Click Sign Up. You can now view and download portions of your medical record. 10. Click the Download Summary menu link to download a portable copy of your medical information. Additional Information    If you have questions, please call [phone]. Remember, M-KOPA is NOT to be used for urgent needs. For medical emergencies, dial 911.

## 2021-12-20 NOTE — PROGRESS NOTES
217 [facility] [place]. [place], [address]  Tel.  [phone]  Fax. 8456 [facility], [address] [facility]  Tel.  [phone]  Fax. [phone] [address]  Tel.  [phone]  Fax. Doni Flowers is a 32y.o. year old male seen for evaluation of a sleep disorder. ASSESSMENT/PLAN:      ICD-10-CM ICD-9-CM    1. CASANDRA (obstructive sleep apnea)  G47.33 327.23 SLEEP STUDY UNATTENDED, 4 CHANNEL   2. Attention deficit hyperactivity disorder (ADHD), combined type  F90.2 314.01    3. BMI 30.0-30.9,adult  Z68.30 V85.30        Patient has a history and examination consistent with the diagnosis of sleep apnea. Follow-up and Dispositions    · Return for telephone follow-up after testing is completed. * The patient currently has a Low Risk for having sleep apnea. STOP-BANG score 3.    * Sleep testing was ordered for initial evaluation. Orders Placed This Encounter    SLEEP STUDY UNATTENDED, 4 CHANNEL     Order Specific Question:   Reason for Exam     Answer:   CASANDRA       * He was provided information on sleep apnea including corresponding risk factors and the importance of proper treatment. * Treatment options were reviewed in detail. He would like to proceed with PAP therapy. Patient will be seen in follow-up in 6-8 weeks after PAP setup to gauge treatment response and adherence to therapy. * The patient was counseled regarding proper sleep hygiene, with emphasis on ensuring sufficient total sleep time; safe driving and the benefits of exercise and weight loss. * All of his questions were addressed. 2. Recommended a dedicated weight loss program through appropriate diet and exercise regimen as significant weight reduction has been shown to reduce severity of obstructive sleep apnea.      SUBJECTIVE/OBJECTIVE:    Brigitte De Leon is an 32 y.o. male referred for evaluation for a sleep disorder. He complains of snoring associated with awakening in the middle of the night because of gasping for air. Symptoms began 4 years ago, unchanged since that time. He usually gets in bed by 8:30 or 9:00 pm and remains awake for 2-1 hours prior to sleep onset. He has to wake-up at 3:45 am to get to work. He denies of symptoms indicative of cataplexy, sleep paralysis or sleep related hallucinations. He denies of a history of unusual movements occurring during sleep. [place] does wake up frequently at night. He is bothered by waking up too early and left unable to get back to sleep. He actually sleeps about 5 hours at night and wakes up about 2 times during the night. He does not work shifts:  .   Sharyle Mais indicates he does get too little sleep at night. His bedtime is 2100. He awakens at 36. He does take naps. He takes 9 naps a week lasting 45 min to an hour. He has the following observed behaviors: Loud snoring,Twitching of legs or feet,Becoming very rigid and/or shaking;  . Other remarks: The patient has not undergone diagnostic testing for the current problems. Review of Systems:  Constitutional:  No significant weight loss or weight gain  Eyes:  No blurred vision  CVS:  No significant chest pain  Pulm:  No significant shortness of breath  GI:  No significant nausea or vomiting  :  No significant nocturia  Musculoskeletal:  No significant joint pain at night  Skin:  No significant rashes  Neuro:  No significant dizziness   Psych:  No active mood issues    Sleep Review of Systems: notable for Positive difficulty falling asleep; Positive awakenings at night; Negative perceived regular dreaming; Negative nightmares; Positive  early morning headaches; Positive  memory problems; Positive  concentration issues; Negative caffeine;  Negative alcohol;   Negative history of any automobile or occupational accidents due to daytime drowsiness.       [place] Sleepiness Score: 16   and Modified F.O.S.Q. Score Total / 2: 8    Patient-Reported Vitals 12/19/2021   Patient-Reported Weight 240   Patient-Reported Height 6       Physical Exam completed by visual and auditory observation of patient with verbal input from patient. General:   Alert, oriented, not in acute distress   Eyes:  Anicteric Sclerae; no obvious strabismus   Nose:  No obvious nasal septum deviation    Neck:   Midline trachea, no visible mass   Chest/Lungs:  Respiratory effort normal, no visualized signs of difficulty breathing or respiratory distress   CVS:  No JVD   Extremities:  No obvious rashes noted on face, neck, or hands   Neuro:  No facial asymmetry, no focal deficits; no obvious tremor    Psych:  Normal affect,  normal countenance     Darryl Coats Grade is being evaluated by a Virtual Visit (video visit) encounter to address concerns as mentioned above. A caregiver was present when appropriate. Due to this being a TeleHealth encounter ([facility]), evaluation of the following organ systems was limited: Vitals/Constitutional/EENT/Resp/CV/GI//MS/Neuro/Skin/Heme-Lymph-Imm. Pursuant to the emergency declaration under the [address] [place] [address] and the [employer] Act, this Virtual Visit was conducted with patient's (and/or legal guardian's) consent, to reduce the patient's risk of exposure to COVID-19 and provide necessary medical care. Patient identification was verified at the start of the visit: YES using name and date of birth. Patient's phone number [phone] (home) [phone] (work) was confirmed for accuracy. He gives permission for messages regarding results and appointments to be left at that number. Services were provided through a video synchronous discussion virtually to substitute for in-person clinic visit.   I was in the office while conducting this encounter, patient located at their home or alternate location of their choice. An electronic signature was used to authenticate this note. Antoinette Pizarro MD, FAASM  Diplomate American Board of Sleep Medicine  Diplomate in Sleep Medicine - ABP    Electronically signed.  12/20/21

## 2022-01-17 ENCOUNTER — DOCUMENTATION ONLY (OUTPATIENT)
Dept: SLEEP MEDICINE | Age: 27
End: 2022-01-17

## 2022-01-20 DIAGNOSIS — M54.2 NECK PAIN: ICD-10-CM

## 2022-01-20 RX ORDER — NAPROXEN 500 MG/1
500 TABLET ORAL 2 TIMES DAILY WITH MEALS
Qty: 60 TABLET | Refills: 2 | Status: SHIPPED | OUTPATIENT
Start: 2022-01-20 | End: 2022-08-29

## 2022-02-08 ENCOUNTER — TELEPHONE (OUTPATIENT)
Dept: SLEEP MEDICINE | Age: 27
End: 2022-02-08

## 2022-02-08 NOTE — TELEPHONE ENCOUNTER
Patient attempted HSAT 1/12/22 and 1/13/22 however, no data was collected. Patient will retry HSAT to acquire better results. We will mail device to patient.

## 2022-03-19 PROBLEM — L50.9 URTICARIA: Status: ACTIVE | Noted: 2018-03-14

## 2022-03-19 PROBLEM — F33.9 EPISODE OF RECURRENT MAJOR DEPRESSIVE DISORDER (HCC): Status: ACTIVE | Noted: 2018-06-20

## 2022-03-20 PROBLEM — F90.2 ATTENTION DEFICIT HYPERACTIVITY DISORDER (ADHD), COMBINED TYPE: Status: ACTIVE | Noted: 2019-04-10

## 2022-04-14 ENCOUNTER — VIRTUAL VISIT (OUTPATIENT)
Dept: FAMILY MEDICINE CLINIC | Age: 27
End: 2022-04-14
Payer: COMMERCIAL

## 2022-04-14 DIAGNOSIS — F90.2 ATTENTION DEFICIT HYPERACTIVITY DISORDER (ADHD), COMBINED TYPE: Primary | ICD-10-CM

## 2022-04-14 DIAGNOSIS — F33.9 EPISODE OF RECURRENT MAJOR DEPRESSIVE DISORDER, UNSPECIFIED DEPRESSION EPISODE SEVERITY (HCC): ICD-10-CM

## 2022-04-14 PROCEDURE — 99213 OFFICE O/P EST LOW 20 MIN: CPT | Performed by: FAMILY MEDICINE

## 2022-04-14 NOTE — PROGRESS NOTES
Consent: Liana Peck, who was seen by synchronous (real-time) audio-video technology, and/or his healthcare decision maker, is aware that this patient-initiated, Telehealth encounter on 4/14/2022 is a billable service, with coverage as determined by his insurance carrier. He is aware that he may receive a bill and has provided verbal consent to proceed: YES-Consent obtained within past 12 months  712    Prior to Admission medications    Medication Sig Start Date End Date Taking? Authorizing Provider   Lisdexamfetamine (VYVANSE) 40 mg capsule Take 1 Capsule by mouth daily for 29 days. Max Daily Amount: 40 mg. 4/14/22 5/13/22 Yes Linda Lin MD   Lisdexamfetamine (VYVANSE) 40 mg capsule Take 1 Capsule by mouth daily for 29 days. Max Daily Amount: 40 mg. 5/14/22 6/12/22 Yes Linda Lin MD   Lisdexamfetamine (VYVANSE) 40 mg capsule Take 1 Capsule by mouth daily for 29 days. Max Daily Amount: 40 mg. 6/13/22 7/12/22 Yes Linda Lin MD   naproxen (NAPROSYN) 500 mg tablet TAKE 1 TABLET BY MOUTH TWO (2) TIMES DAILY (WITH MEALS). 1/20/22   Linda Lin MD   loratadine (Claritin) 10 mg tablet Take 1 Tablet by mouth daily for 360 days. 12/9/21 12/4/22  Linda Lin MD   Lisdexamfetamine (VYVANSE) 40 mg capsule Take 1 Capsule by mouth daily. Max Daily Amount: 40 mg. 9/9/21   Linda Lin MD   Lisdexamfetamine (VYVANSE) 40 mg capsule Take 1 Capsule by mouth daily. Max Daily Amount: 40 mg. 11/9/21   Linda Lin MD   Lisdexamfetamine (VYVANSE) 40 mg capsule Take 1 Capsule by mouth daily. Max Daily Amount: 40 mg. Patient not taking: Reported on 12/20/2021 10/9/21   Linda Lin MD   pantoprazole (PROTONIX) 40 mg tablet Take 1 Tab by mouth daily. 9/22/20   Linda Lin MD   Cetirizine (ZYRTEC) 10 mg cap One tablet daily 5/8/19   Linda Lin MD   ibuprofen (MOTRIN) 800 mg tablet Take 1 Tab by mouth every eight (8) hours as needed for Pain.  12/26/17   Hedy Almaraz NP     No Known Allergies        Assessment & Plan:   Diagnoses and all orders for this visit:    1. Attention deficit hyperactivity disorder (ADHD), combined type  -     Lisdexamfetamine (VYVANSE) 40 mg capsule; Take 1 Capsule by mouth daily for 29 days. Max Daily Amount: 40 mg.  -     Lisdexamfetamine (VYVANSE) 40 mg capsule; Take 1 Capsule by mouth daily for 29 days. Max Daily Amount: 40 mg.  -     Lisdexamfetamine (VYVANSE) 40 mg capsule; Take 1 Capsule by mouth daily for 29 days. Max Daily Amount: 40 mg.    2. Episode of recurrent major depressive disorder, unspecified depression episode severity ([address]  Stable off medications and is continue with counseling      Medication Side Effects and Warnings were discussed with patient  Patient Labs were reviewed and or requested:  Patient Past Records were reviewed and or requested              We discussed the expected course, resolution and complications of the diagnosis(es) in detail. Medication risks, benefits, costs, interactions, and alternatives were discussed as indicated. I advised him to contact the office if his condition worsens, changes or fails to improve as anticipated. He expressed understanding with the diagnosis(es) and plan. Bonifacio Gonzalez, was evaluated through a synchronous (real-time) audio-video encounter. The patient (or guardian if applicable) is aware that this is a billable service, which includes applicable co-pays. This Virtual Visit was conducted with patient's (and/or legal guardian's) consent. The visit was conducted pursuant to the emergency declaration under the [address] [place] [address] authority and the [employer] and [employer] Act. Patient identification was verified, and a caregiver was present when appropriate. The patient was located in a state where the provider was licensed to provide care.      Services were provided through a video synchronous discussion virtually to substitute for in-person clinic visit. Patient and provider were located at their individual homes. I have discussed the diagnosis with the patient and the intended plan as seen in the above orders. The patient understands and agrees with the plan. The patient has received an after-visit summary and questions were answered concerning future plans. Medication Side Effects and Warnings were discussed with patient  Patient Labs were reviewed and or requested:  Patient Past Records were reviewed and or requested    Kamilah Stubbs M.D. There are no Patient Instructions on file for this visit.

## 2022-08-29 ENCOUNTER — VIRTUAL VISIT (OUTPATIENT)
Dept: FAMILY MEDICINE CLINIC | Age: 27
End: 2022-08-29

## 2022-08-29 DIAGNOSIS — F90.2 ATTENTION DEFICIT HYPERACTIVITY DISORDER (ADHD), COMBINED TYPE: Primary | ICD-10-CM

## 2022-08-29 DIAGNOSIS — Z79.899 ON STIMULANT MEDICATION: ICD-10-CM

## 2022-08-29 PROCEDURE — 99213 OFFICE O/P EST LOW 20 MIN: CPT | Performed by: NURSE PRACTITIONER

## 2022-08-29 NOTE — PROGRESS NOTES
Juan Gastelum is a 32 y.o. male who was seen by synchronous (real-time) audio-video technology on 8/29/2022 for Medication Refill        Assessment & Plan:   Diagnoses and all orders for this visit:    1. Attention deficit hyperactivity disorder (ADHD), combined type  2. On stimulant medication  Symptoms controlled   review is without irregularities  Urine compliance screening due-order entered, patient will come into the office to collect a specimen sometime this week  Continue rx: will send 30 day Rx today. will send 2 additional months Rx once normal urine compliance screening result is confirmed  Face to face visit required in 3 months for add'l refills  -     [facility]; Future  -     Lisdexamfetamine (VYVANSE) 40 mg capsule; Take 1 Capsule by mouth daily. Max Daily Amount: 40 mg. Follow-up and Dispositions    Return for will come to office this week to collect UDS; follow up appt in 3 months for ADHD. I have discussed the diagnosis with the patient and the intended plan as seen in the above orders, and questions were answered concerning future plans. Patient conveyed understanding of the plan at the time of the visit. Subjective:     Presents for f/u of ADD. Doing well, reports symptoms well-controlled on current dose vyvanse, good compliance, tolerating well without apparent side effects. Denies chest pain, dyspnea on exertion, headache, blurred vision, tremor, appetite change, or insomnia. Last urine compliance screening: none on file in last 2 years    Prior to Admission medications    Medication Sig Start Date End Date Taking? Authorizing Provider   Lisdexamfetamine (VYVANSE) 40 mg capsule Take 1 Capsule by mouth daily. Max Daily Amount: 40 mg. 8/29/22  Yes Angel Black, NP   loratadine (Claritin) 10 mg tablet Take 1 Tablet by mouth daily for 360 days.  12/9/21 12/4/22 Yes Richardine Primrose, MD   Lisdexamfetamine (VYVANSE) 40 mg capsule Take 1 Capsule by mouth daily. Max Daily Amount: 40 mg. 11/9/21  Yes Gely Mccullough MD   Cetirizine (ZYRTEC) 10 mg cap One tablet daily 5/8/19  Yes Gely Mccullough MD   ibuprofen (MOTRIN) 800 mg tablet Take 1 Tab by mouth every eight (8) hours as needed for Pain. 12/26/17  Yes Iman Ovalle NP   naproxen (NAPROSYN) 500 mg tablet TAKE 1 TABLET BY MOUTH TWO (2) TIMES DAILY (WITH MEALS). Patient not taking: Reported on 8/29/2022 1/20/22 8/29/22  Gely Mccullough MD   Lisdexamfetamine (VYVANSE) 40 mg capsule Take 1 Capsule by mouth daily. Max Daily Amount: 40 mg. Patient not taking: No sig reported 10/9/21   Gely Mccullough MD   Lisdexamfetamine (VYVANSE) 40 mg capsule Take 1 Capsule by mouth daily. Max Daily Amount: 40 mg. 9/9/21 8/29/22  Gely Mccullough MD   pantoprazole (PROTONIX) 40 mg tablet Take 1 Tab by mouth daily. Patient not taking: Reported on 8/29/2022 9/22/20 8/29/22  Gely Mccullough MD     Patient Active Problem List   Diagnosis Code    Urticaria L50.9    Episode of recurrent major depressive disorder [facility]) F33.9    Attention deficit hyperactivity disorder (ADHD), combined type F90.2     No Known Allergies  History reviewed. No pertinent past medical history. History reviewed. No pertinent surgical history. Family History   Problem Relation Age of Onset    Diabetes Mother     Asthma Mother     Diabetes Father      Social History     Tobacco Use    Smoking status: Never    Smokeless tobacco: Never   Substance Use Topics    Alcohol use: No       Review of Systems   Constitutional: Negative. HENT: Negative. Eyes: Negative. Respiratory: Negative. Cardiovascular: Negative. Gastrointestinal: Negative. Genitourinary: Negative. Musculoskeletal: Negative. Skin: Negative. Neurological: Negative. Endo/Heme/Allergies: Negative. Psychiatric/Behavioral: Negative.        Objective:     Patient-Reported Vitals 4/14/2022   Patient-Reported Weight 240   Patient-Reported Height 72 General: alert, cooperative, no distress   Mental  status: normal mood, behavior, speech, dress, motor activity, and thought processes, able to follow commands   HENT: NCAT   Neck: no visualized mass   Resp: no respiratory distress   Neuro: no gross deficits   Skin: no discoloration or lesions of concern on visible areas   Psychiatric: normal affect, consistent with stated mood, no evidence of hallucinations     Additional exam findings:   none    We discussed the expected course, resolution and complications of the diagnosis(es) in detail. Medication risks, benefits, costs, interactions, and alternatives were discussed as indicated. I advised him to contact the office if his condition worsens, changes or fails to improve as anticipated. He expressed understanding with the diagnosis(es) and plan. Leala Moritz, was evaluated through a synchronous (real-time) audio-video encounter. The patient (or guardian if applicable) is aware that this is a billable service, which includes applicable co-pays. This Virtual Visit was conducted with patient's (and/or legal guardian's) consent. The visit was conducted pursuant to the emergency declaration under the [address] [place] [address] authority and the [employer] General Act. Patient identification was verified, and a caregiver was present when appropriate.   The patient was located at: Home: [place]  [id number]  The provider was located at: Home: [place], [place]  8/29/2022

## 2022-10-13 ENCOUNTER — VIRTUAL VISIT (OUTPATIENT)
Dept: FAMILY MEDICINE CLINIC | Age: 27
End: 2022-10-13
Payer: COMMERCIAL

## 2022-10-13 DIAGNOSIS — Z79.899 ON STIMULANT MEDICATION: ICD-10-CM

## 2022-10-13 DIAGNOSIS — F90.2 ATTENTION DEFICIT HYPERACTIVITY DISORDER (ADHD), COMBINED TYPE: ICD-10-CM

## 2022-10-13 PROCEDURE — 99213 OFFICE O/P EST LOW 20 MIN: CPT | Performed by: FAMILY MEDICINE

## 2022-10-13 NOTE — PROGRESS NOTES
Consent: Grace Green, who was seen by synchronous (real-time) audio-video technology, and/or his healthcare decision maker, is aware that this patient-initiated, Telehealth encounter on 10/13/2022 is a billable service, with coverage as determined by his insurance carrier. He is aware that he may receive a bill and has provided verbal consent to proceed: YES-Consent obtained within past 12 months  712    Prior to Admission medications    Medication Sig Start Date End Date Taking? Authorizing Provider   Lisdexamfetamine (VYVANSE) 40 mg capsule Take 1 Capsule by mouth daily. Max Daily Amount: 40 mg. 10/13/22  Yes Nancy Kent MD   Lisdexamfetamine (VYVANSE) 40 mg capsule Take 1 Capsule by mouth daily for 29 days. Max Daily Amount: 40 mg. 12/12/22 1/10/23 Yes Nancy Kent MD   Lisdexamfetamine (VYVANSE) 40 mg capsule Take 1 Capsule by mouth daily for 29 days. Max Daily Amount: 40 mg. 11/12/22 12/11/22 Yes Nancy Kent MD   Lisdexamfetamine (VYVANSE) 40 mg capsule Take 1 Capsule by mouth daily. Max Daily Amount: 40 mg. 8/29/22 10/13/22  Salvatore Michelle NP   Lisdexamfetamine (VYVANSE) 40 mg capsule Take 1 Capsule by mouth daily for 29 days. Max Daily Amount: 40 mg. 4/14/22 10/13/22  Nancy Kent MD   Lisdexamfetamine (VYVANSE) 40 mg capsule Take 1 Capsule by mouth daily for 29 days. Max Daily Amount: 40 mg. 5/14/22 10/13/22  Nancy Kent MD   loratadine (Claritin) 10 mg tablet Take 1 Tablet by mouth daily for 360 days. 12/9/21 12/4/22  Nancy Kent MD   Lisdexamfetamine (VYVANSE) 40 mg capsule Take 1 Capsule by mouth daily. Max Daily Amount: 40 mg. 11/9/21   Nancy Kent MD   Lisdexamfetamine (VYVANSE) 40 mg capsule Take 1 Capsule by mouth daily. Max Daily Amount: 40 mg.   Patient not taking: No sig reported 10/9/21   Nancy Kent MD   Cetirizine (ZYRTEC) 10 mg cap One tablet daily 5/8/19   Nancy Kent MD   ibuprofen (MOTRIN) 800 mg tablet Take 1 Tab by mouth every eight (8) hours as needed for Pain. 12/26/17   Martina Blackmon NP     No Known Allergies        Assessment & Plan:   Diagnoses and all orders for this visit:    1. Attention deficit hyperactivity disorder (ADHD), combined type  -     Lisdexamfetamine (VYVANSE) 40 mg capsule; Take 1 Capsule by mouth daily. Max Daily Amount: 40 mg.  -     Lisdexamfetamine (VYVANSE) 40 mg capsule; Take 1 Capsule by mouth daily for 29 days. Max Daily Amount: 40 mg.  -     Lisdexamfetamine (VYVANSE) 40 mg capsule; Take 1 Capsule by mouth daily for 29 days. Max Daily Amount: 40 mg.    2. On stimulant medication  -     Lisdexamfetamine (VYVANSE) 40 mg capsule; Take 1 Capsule by mouth daily. Max Daily Amount: 40 mg.        Medication Side Effects and Warnings were discussed with patient  Patient Labs were reviewed and or requested:  Patient Past Records were reviewed and or requested              We discussed the expected course, resolution and complications of the diagnosis(es) in detail. Medication risks, benefits, costs, interactions, and alternatives were discussed as indicated. I advised him to contact the office if his condition worsens, changes or fails to improve as anticipated. He expressed understanding with the diagnosis(es) and plan. Ping Ochoaari, was evaluated through a synchronous (real-time) audio-video encounter. The patient (or guardian if applicable) is aware that this is a billable service, which includes applicable co-pays. This Virtual Visit was conducted with patient's (and/or legal guardian's) consent. The visit was conducted pursuant to the emergency declaration under the [address] [place] [address], [address] [place] [address] authority and the [employer] General Act. Patient identification was verified, and a caregiver was present when appropriate. The patient was located in a state where the provider was licensed to provide care.      Services were provided through a video synchronous discussion virtually to substitute for in-person clinic visit. Patient and provider were located at their individual homes. I have discussed the diagnosis with the patient and the intended plan as seen in the above orders. The patient understands and agrees with the plan. The patient has received an after-visit summary and questions were answered concerning future plans. Medication Side Effects and Warnings were discussed with patient  Patient Labs were reviewed and or requested:  Patient Past Records were reviewed and or requested    Shannan Truong M.D. There are no Patient Instructions on file for this visit.

## 2023-03-02 ENCOUNTER — VIRTUAL VISIT (OUTPATIENT)
Dept: FAMILY MEDICINE CLINIC | Age: 28
End: 2023-03-02
Payer: COMMERCIAL

## 2023-03-02 DIAGNOSIS — F33.9 EPISODE OF RECURRENT MAJOR DEPRESSIVE DISORDER, UNSPECIFIED DEPRESSION EPISODE SEVERITY (HCC): ICD-10-CM

## 2023-03-02 DIAGNOSIS — F90.2 ATTENTION DEFICIT HYPERACTIVITY DISORDER (ADHD), COMBINED TYPE: Primary | ICD-10-CM

## 2023-03-02 PROCEDURE — 99213 OFFICE O/P EST LOW 20 MIN: CPT | Performed by: FAMILY MEDICINE

## 2023-03-02 NOTE — PROGRESS NOTES
Consent: Mauricio Pisano, who was seen by synchronous (real-time) audio-video technology, and/or his healthcare decision maker, is aware that this patient-initiated, Telehealth encounter on 3/2/2023 is a billable service, with coverage as determined by his insurance carrier. He is aware that he may receive a bill and has provided verbal consent to proceed: YES-Consent obtained within past 12 months  712    Prior to Admission medications    Medication Sig Start Date End Date Taking? Authorizing Provider   lisdexamfetamine (VYVANSE) 50 mg cap Take 1 Capsule by mouth daily for 29 days. Max Daily Amount: 50 mg. 3/2/23 3/31/23 Yes Trinidad Dejesus MD   lisdexamfetamine (VYVANSE) 50 mg cap Take 1 Capsule by mouth daily for 29 days. Max Daily Amount: 50 mg. 4/1/23 4/30/23 Yes Trinidad Dejesus MD   lisdexamfetamine (VYVANSE) 50 mg cap Take 1 Capsule by mouth daily for 29 days. Max Daily Amount: 50 mg. 5/1/23 5/30/23 Yes Trinidad Dejesus MD   Lisdexamfetamine (VYVANSE) 40 mg capsule Take 1 Capsule by mouth daily. Max Daily Amount: 40 mg. 10/13/22 3/2/23  Trinidad Dejesus MD   Lisdexamfetamine (VYVANSE) 40 mg capsule Take 1 Capsule by mouth daily. Max Daily Amount: 40 mg. 11/9/21 3/2/23  Trinidad Dejesus MD   Lisdexamfetamine (VYVANSE) 40 mg capsule Take 1 Capsule by mouth daily. Max Daily Amount: 40 mg. Patient not taking: No sig reported 10/9/21 3/2/23  Trinidad Dejesus MD   Cetirizine (ZYRTEC) 10 mg cap One tablet daily 5/8/19   Trinidad Dejesus MD   ibuprofen (MOTRIN) 800 mg tablet Take 1 Tab by mouth every eight (8) hours as needed for Pain. 12/26/17   Lauren Mckeon NP     No Known Allergies        Assessment & Plan:   Diagnoses and all orders for this visit:    1. Attention deficit hyperactivity disorder (ADHD), combined type  -     lisdexamfetamine (VYVANSE) 50 mg cap; Take 1 Capsule by mouth daily for 29 days. Max Daily Amount: 50 mg.  -     lisdexamfetamine (VYVANSE) 50 mg cap;  Take 1 Capsule by mouth daily for 29 days. Max Daily Amount: 50 mg.  -     lisdexamfetamine (VYVANSE) 50 mg cap; Take 1 Capsule by mouth daily for 29 days. Max Daily Amount: 50 mg. Patient would like to increase to 40 mg to 50 mg. Increase to 50 and see how he does. 2. Episode of recurrent major depressive disorder, unspecified depression episode severity ([address]  Not currently medications. Medication Side Effects and Warnings were discussed with patient  Patient Labs were reviewed and or requested:  Patient Past Records were reviewed and or requested              We discussed the expected course, resolution and complications of the diagnosis(es) in detail. Medication risks, benefits, costs, interactions, and alternatives were discussed as indicated. I advised him to contact the office if his condition worsens, changes or fails to improve as anticipated. He expressed understanding with the diagnosis(es) and plan. Services were provided through a video synchronous discussion virtually to substitute for in-person clinic visit. Patient and provider were located at their individual homes. Shahid García was evaluated through a synchronous (real-time) audio-video encounter. The patient (or guardian if applicable) is aware that this is a billable service, which includes applicable co-pays. This Virtual Visit was conducted with patient's (and/or legal guardian's) consent. The visit was conducted pursuant to the emergency declaration under the [facility], [address] [place] [address] authority and the [employer] General Act. Patient identification was verified, and a caregiver was present when appropriate. The patient was located at: Home: [address]  The provider was located at: Home: Samir Ambriz MD on 3/2/2023 at 9:15 AM    An electronic signature was used to authenticate this note.     I have discussed the diagnosis with the patient and the intended plan as seen in the above orders. The patient understands and agrees with the plan. The patient has received an after-visit summary and questions were answered concerning future plans. Medication Side Effects and Warnings were discussed with patient  Patient Labs were reviewed and or requested:  Patient Past Records were reviewed and or requested    Naz Gomez M.D. There are no Patient Instructions on file for this visit.

## 2023-03-30 ENCOUNTER — TELEPHONE (OUTPATIENT)
Dept: FAMILY MEDICINE CLINIC | Age: 28
End: 2023-03-30

## 2023-03-30 NOTE — TELEPHONE ENCOUNTER
Patient called stating that the 50 MG of Vyvanse is too strong for him and he would like to switch back to 40 MG

## 2023-03-31 DIAGNOSIS — F90.2 ATTENTION DEFICIT HYPERACTIVITY DISORDER (ADHD), COMBINED TYPE: Primary | ICD-10-CM

## 2023-09-11 ENCOUNTER — OFFICE VISIT (OUTPATIENT)
Age: 28
End: 2023-09-11
Payer: COMMERCIAL

## 2023-09-11 VITALS
TEMPERATURE: 98.2 F | HEART RATE: 69 BPM | WEIGHT: 234 LBS | RESPIRATION RATE: 20 BRPM | OXYGEN SATURATION: 99 % | SYSTOLIC BLOOD PRESSURE: 131 MMHG | DIASTOLIC BLOOD PRESSURE: 81 MMHG | HEIGHT: 74 IN | BODY MASS INDEX: 30.03 KG/M2

## 2023-09-11 DIAGNOSIS — G44.209 TENSION-TYPE HEADACHE, NOT INTRACTABLE, UNSPECIFIED CHRONICITY PATTERN: ICD-10-CM

## 2023-09-11 DIAGNOSIS — F90.2 ATTENTION DEFICIT HYPERACTIVITY DISORDER (ADHD), COMBINED TYPE: Primary | ICD-10-CM

## 2023-09-11 PROCEDURE — 99213 OFFICE O/P EST LOW 20 MIN: CPT | Performed by: FAMILY MEDICINE

## 2023-09-11 RX ORDER — LISDEXAMFETAMINE DIMESYLATE 40 MG/1
CAPSULE ORAL
COMMUNITY
End: 2023-09-11 | Stop reason: ALTCHOICE

## 2023-09-11 SDOH — ECONOMIC STABILITY: HOUSING INSECURITY
IN THE LAST 12 MONTHS, WAS THERE A TIME WHEN YOU DID NOT HAVE A STEADY PLACE TO SLEEP OR SLEPT IN A SHELTER (INCLUDING NOW)?: NO

## 2023-09-11 SDOH — ECONOMIC STABILITY: FOOD INSECURITY: WITHIN THE PAST 12 MONTHS, YOU WORRIED THAT YOUR FOOD WOULD RUN OUT BEFORE YOU GOT MONEY TO BUY MORE.: NEVER TRUE

## 2023-09-11 SDOH — ECONOMIC STABILITY: INCOME INSECURITY: HOW HARD IS IT FOR YOU TO PAY FOR THE VERY BASICS LIKE FOOD, HOUSING, MEDICAL CARE, AND HEATING?: NOT VERY HARD

## 2023-09-11 SDOH — ECONOMIC STABILITY: TRANSPORTATION INSECURITY
IN THE PAST 12 MONTHS, HAS LACK OF TRANSPORTATION KEPT YOU FROM MEETINGS, WORK, OR FROM GETTING THINGS NEEDED FOR DAILY LIVING?: NO

## 2023-09-11 SDOH — ECONOMIC STABILITY: FOOD INSECURITY: WITHIN THE PAST 12 MONTHS, THE FOOD YOU BOUGHT JUST DIDN'T LAST AND YOU DIDN'T HAVE MONEY TO GET MORE.: NEVER TRUE

## 2023-09-11 ASSESSMENT — PATIENT HEALTH QUESTIONNAIRE - PHQ9
3. TROUBLE FALLING OR STAYING ASLEEP: 0
10. IF YOU CHECKED OFF ANY PROBLEMS, HOW DIFFICULT HAVE THESE PROBLEMS MADE IT FOR YOU TO DO YOUR WORK, TAKE CARE OF THINGS AT HOME, OR GET ALONG WITH OTHER PEOPLE: 0
SUM OF ALL RESPONSES TO PHQ9 QUESTIONS 1 & 2: 0
7. TROUBLE CONCENTRATING ON THINGS, SUCH AS READING THE NEWSPAPER OR WATCHING TELEVISION: 0
5. POOR APPETITE OR OVEREATING: 0
8. MOVING OR SPEAKING SO SLOWLY THAT OTHER PEOPLE COULD HAVE NOTICED. OR THE OPPOSITE, BEING SO FIGETY OR RESTLESS THAT YOU HAVE BEEN MOVING AROUND A LOT MORE THAN USUAL: 0
4. FEELING TIRED OR HAVING LITTLE ENERGY: 0
2. FEELING DOWN, DEPRESSED OR HOPELESS: 0
SUM OF ALL RESPONSES TO PHQ QUESTIONS 1-9: 0
6. FEELING BAD ABOUT YOURSELF - OR THAT YOU ARE A FAILURE OR HAVE LET YOURSELF OR YOUR FAMILY DOWN: 0
SUM OF ALL RESPONSES TO PHQ QUESTIONS 1-9: 0
SUM OF ALL RESPONSES TO PHQ QUESTIONS 1-9: 0
9. THOUGHTS THAT YOU WOULD BE BETTER OFF DEAD, OR OF HURTING YOURSELF: 0
1. LITTLE INTEREST OR PLEASURE IN DOING THINGS: 0
SUM OF ALL RESPONSES TO PHQ QUESTIONS 1-9: 0

## 2023-09-11 NOTE — PROGRESS NOTES
Chief Complaint   Patient presents with    Medication Refill     Vyvanse refill    Headache     Anixety & stress makes HA come     1. Have you been to the ER, urgent care clinic since your last visit? Hospitalized since your last visit? No    2. Have you seen or consulted any other health care providers outside of the [address] [place] [address] Avenue since your last visit? Include any pap smears or colon screening.  No

## 2023-09-11 NOTE — PROGRESS NOTES
Chief Complaint   Patient presents with    Medication Refill     Vyvanse refill    Headache     Anixety & stress makes HA come     1. Have you been to the ER, urgent care clinic since your last visit? Hospitalized since your last visit? No    2. Have you seen or consulted any other health care providers outside of the [address] [place] [address] since your last visit? Include any pap smears or colon screening. No      Chief Complaint   Patient presents with    Medication Refill     Vyvanse refill    Headache     Anixety & stress makes HA come     He is a 29 y.o. male who presents for evalution. Reviewed PmHx, RxHx, FmHx, SocHx, AllgHx and updated and dated in the chart. CURRENT MEDS W/ ASSOC DIAG           Start Date End Date     Cetirizine HCl 10 MG CAPS  05/08/19  --     Associated Diagnoses:  --     ibuprofen (ADVIL;MOTRIN) 800 MG tablet  12/26/17  --     Associated Diagnoses:  --     Lisdexamfetamine Dimesylate 40 MG CAPS  09/11/23  10/11/23     Take 40 mg by mouth daily for 30 days. Max Daily Amount: 40 mg     Associated Diagnoses:  Attention deficit hyperactivity disorder (ADHD), combined type     Lisdexamfetamine Dimesylate 40 MG CAPS  10/11/23  11/10/23     Take 40 mg by mouth daily for 30 days. Max Daily Amount: 40 mg     Associated Diagnoses:  Attention deficit hyperactivity disorder (ADHD), combined type     Lisdexamfetamine Dimesylate 40 MG CAPS  11/10/23  12/10/23     Take 40 mg by mouth daily for 30 days.  Max Daily Amount: 40 mg     Associated Diagnoses:  Attention deficit hyperactivity disorder (ADHD), combined type             Patient Active Problem List   Diagnosis Code    Urticaria L50.9    Episode of recurrent major depressive disorder ([address]) F33.9    Attention deficit hyperactivity disorder (ADHD), combined type F90.2        Review of Systems - negative except as listed above in the HPI    Objective:     Vitals:    09/11/23 1419   BP: 131/81   Site: Left Upper Arm   Position: Sitting   Cuff

## 2023-09-18 ENCOUNTER — TELEPHONE (OUTPATIENT)
Age: 28
End: 2023-09-18

## 2023-09-18 NOTE — TELEPHONE ENCOUNTER
[facility]  pt say's he is not sleeping and that it what is causing his headaches

## 2023-11-27 ENCOUNTER — TELEPHONE (OUTPATIENT)
Age: 28
End: 2023-11-27

## 2023-11-27 DIAGNOSIS — F90.2 ATTENTION DEFICIT HYPERACTIVITY DISORDER (ADHD), COMBINED TYPE: ICD-10-CM

## 2023-11-27 RX ORDER — LISDEXAMFETAMINE DIMESYLATE CAPSULES 40 MG/1
40 CAPSULE ORAL DAILY
Qty: 30 CAPSULE | Refills: 0 | Status: SHIPPED | OUTPATIENT
Start: 2023-11-27 | End: 2023-12-27

## 2023-11-27 NOTE — TELEPHONE ENCOUNTER
[facility] needs a refill of Lisdexamfetamine Dimesylate. They have 0 pills/supply left and are requesting a 30 day supply with refills. Pharmacy has been updated in the chart. Patient was advised or scheduled an appointment for the future and to request refills thru the "[employer] Rich or by requesting a refill from their pharmacy in the future. Patient was also advised to check with their pharmacy for status of when refills are available.

## 2023-12-14 ENCOUNTER — TELEMEDICINE (OUTPATIENT)
Age: 28
End: 2023-12-14
Payer: COMMERCIAL

## 2023-12-14 DIAGNOSIS — F90.2 ATTENTION DEFICIT HYPERACTIVITY DISORDER (ADHD), COMBINED TYPE: Primary | ICD-10-CM

## 2023-12-14 PROCEDURE — 99213 OFFICE O/P EST LOW 20 MIN: CPT | Performed by: FAMILY MEDICINE

## 2023-12-14 RX ORDER — LISDEXAMFETAMINE DIMESYLATE CAPSULES 40 MG/1
40 CAPSULE ORAL DAILY
Qty: 30 CAPSULE | Refills: 0 | Status: SHIPPED | OUTPATIENT
Start: 2023-12-14 | End: 2024-01-13

## 2023-12-14 RX ORDER — LISDEXAMFETAMINE DIMESYLATE CAPSULES 40 MG/1
40 CAPSULE ORAL DAILY
Qty: 30 CAPSULE | Refills: 0 | Status: SHIPPED | OUTPATIENT
Start: 2024-02-12 | End: 2024-03-13

## 2023-12-14 RX ORDER — LISDEXAMFETAMINE DIMESYLATE CAPSULES 40 MG/1
40 CAPSULE ORAL DAILY
Qty: 30 CAPSULE | Refills: 0 | Status: SHIPPED | OUTPATIENT
Start: 2024-01-13 | End: 2024-02-12

## 2023-12-14 NOTE — PROGRESS NOTES
Consent: Jeremy Minors, who was seen by synchronous (real-time) audio-video technology, and/or his healthcare decision maker, is aware that this patient-initiated, Telehealth encounter on 12/14/2023 is a billable service, with coverage as determined by his insurance carrier. He is aware that he may receive a bill and has provided verbal consent to proceed: YES-Consent obtained within past 12 months  712    Prior to Admission medications    Medication Sig Start Date End Date Taking? Authorizing Provider   Lisdexamfetamine Dimesylate 40 MG CAPS Take 40 mg by mouth daily for 30 days. Max Daily Amount: 40 mg 12/14/23 1/13/24 Yes Chris Vazquez MD   Lisdexamfetamine Dimesylate 40 MG CAPS Take 40 mg by mouth daily for 30 days. Max Daily Amount: 40 mg 1/13/24 2/12/24 Yes Chris Vazquez MD   Lisdexamfetamine Dimesylate 40 MG CAPS Take 40 mg by mouth daily for 30 days. Max Daily Amount: 40 mg 2/12/24 3/13/24 Yes Chris Vazquez MD   Lisdexamfetamine Dimesylate 40 MG CAPS Take 40 mg by mouth daily for 30 days. Max Daily Amount: 40 mg 11/27/23 12/27/23  Chris Vazquez MD   Lisdexamfetamine Dimesylate 40 MG CAPS Take 40 mg by mouth daily for 30 days. Max Daily Amount: 40 mg 9/11/23 10/11/23  MD Shanell Manleydexamfetamine Dimesylate 40 MG CAPS Take 40 mg by mouth daily for 30 days. Max Daily Amount: 40 mg 10/11/23 11/10/23  Chris Vazquez MD   Cetirizine HCl 10 MG CAPS One tablet daily 5/8/19   Automatic Reconciliation, Ar   ibuprofen (ADVIL;MOTRIN) 800 MG tablet Take by mouth every 8 hours as needed 12/26/17   Automatic Reconciliation, Ar     No Known Allergies        Assessment & Plan:       ICD-10-CM    1.  Attention deficit hyperactivity disorder (ADHD), combined type  F90.2 Lisdexamfetamine Dimesylate 40 MG CAPS     Lisdexamfetamine Dimesylate 40 MG CAPS     Lisdexamfetamine Dimesylate 40 MG CAPS          Time was used for level of billing: yes, 20 minutes reviewing previous notes, test results and

## 2024-04-25 ENCOUNTER — CLINICAL DOCUMENTATION (OUTPATIENT)
Age: 29
End: 2024-04-25

## 2024-10-16 ENCOUNTER — TELEMEDICINE (OUTPATIENT)
Age: 29
End: 2024-10-16
Payer: COMMERCIAL

## 2024-10-16 DIAGNOSIS — F90.2 ATTENTION DEFICIT HYPERACTIVITY DISORDER (ADHD), COMBINED TYPE: Primary | ICD-10-CM

## 2024-10-16 DIAGNOSIS — G43.809 OTHER MIGRAINE WITHOUT STATUS MIGRAINOSUS, NOT INTRACTABLE: ICD-10-CM

## 2024-10-16 DIAGNOSIS — K21.9 GASTROESOPHAGEAL REFLUX DISEASE WITHOUT ESOPHAGITIS: ICD-10-CM

## 2024-10-16 PROBLEM — G43.909 MIGRAINE: Status: ACTIVE | Noted: 2024-10-16

## 2024-10-16 PROCEDURE — 99214 OFFICE O/P EST MOD 30 MIN: CPT | Performed by: FAMILY MEDICINE

## 2024-10-16 RX ORDER — LISDEXAMFETAMINE DIMESYLATE 30 MG/1
30 CAPSULE ORAL DAILY
Qty: 30 CAPSULE | Refills: 0 | Status: SHIPPED | OUTPATIENT
Start: 2024-12-15 | End: 2025-01-14

## 2024-10-16 RX ORDER — LISDEXAMFETAMINE DIMESYLATE 30 MG/1
30 CAPSULE ORAL DAILY
Qty: 30 CAPSULE | Refills: 0 | Status: SHIPPED | OUTPATIENT
Start: 2024-11-15 | End: 2024-12-15

## 2024-10-16 RX ORDER — LISDEXAMFETAMINE DIMESYLATE 30 MG/1
30 CAPSULE ORAL DAILY
Qty: 30 CAPSULE | Refills: 0 | Status: SHIPPED | OUTPATIENT
Start: 2024-10-16 | End: 2024-11-15

## 2024-10-16 SDOH — ECONOMIC STABILITY: FOOD INSECURITY: WITHIN THE PAST 12 MONTHS, YOU WORRIED THAT YOUR FOOD WOULD RUN OUT BEFORE YOU GOT MONEY TO BUY MORE.: NEVER TRUE

## 2024-10-16 SDOH — ECONOMIC STABILITY: FOOD INSECURITY: WITHIN THE PAST 12 MONTHS, THE FOOD YOU BOUGHT JUST DIDN'T LAST AND YOU DIDN'T HAVE MONEY TO GET MORE.: NEVER TRUE

## 2024-10-16 SDOH — ECONOMIC STABILITY: INCOME INSECURITY: HOW HARD IS IT FOR YOU TO PAY FOR THE VERY BASICS LIKE FOOD, HOUSING, MEDICAL CARE, AND HEATING?: NOT HARD AT ALL

## 2024-10-16 ASSESSMENT — PATIENT HEALTH QUESTIONNAIRE - PHQ9
2. FEELING DOWN, DEPRESSED OR HOPELESS: NOT AT ALL
SUM OF ALL RESPONSES TO PHQ QUESTIONS 1-9: 0
1. LITTLE INTEREST OR PLEASURE IN DOING THINGS: NOT AT ALL
SUM OF ALL RESPONSES TO PHQ9 QUESTIONS 1 & 2: 0
SUM OF ALL RESPONSES TO PHQ QUESTIONS 1-9: 0

## 2024-10-16 NOTE — PROGRESS NOTES
the above orders.  The patient understands and agrees with the plan. The patient has received an after-visit summary and questions were answered concerning future plans.     Medication Side Effects and Warnings were discussed with patient  Patient Labs were reviewed and or requested:  Patient Past Records were reviewed and or requested    Vipin Avery M.D.    There are no Patient Instructions on file for this visit.    Please note that this dictation was completed with Dragon and JUAN, artificial intelligence software.  Quite often unanticipated grammatical, syntax, homophones, and other interpretive errors are inadvertently transcribed by the computer software.  Please disregard these errors.  Please excuse any errors that have escaped final proofreading.  Thank you.         The patient (or guardian, if applicable) and other individuals in attendance with the patient were advised that Artificial Intelligence will be utilized during this visit to record and process the conversation to generate a clinical note. The patient (or guardian, if applicable) and other individuals in attendance at the appointment consented to the use of AI, including the recording.      An electronic signature was used to authenticate this note.    --Vipin Avery MD